# Patient Record
Sex: MALE | Race: WHITE | Employment: FULL TIME | URBAN - NONMETROPOLITAN AREA
[De-identification: names, ages, dates, MRNs, and addresses within clinical notes are randomized per-mention and may not be internally consistent; named-entity substitution may affect disease eponyms.]

---

## 2017-07-04 ENCOUNTER — APPOINTMENT (OUTPATIENT)
Dept: GENERAL RADIOLOGY | Age: 42
End: 2017-07-04
Payer: COMMERCIAL

## 2017-07-04 ENCOUNTER — HOSPITAL ENCOUNTER (EMERGENCY)
Age: 42
Discharge: HOME | End: 2017-07-05
Attending: EMERGENCY MEDICINE
Payer: COMMERCIAL

## 2017-07-04 DIAGNOSIS — F41.0 PANIC ATTACKS: Primary | ICD-10-CM

## 2017-07-04 DIAGNOSIS — F41.9 ANXIETY: ICD-10-CM

## 2017-07-04 DIAGNOSIS — R07.89 OTHER CHEST PAIN: ICD-10-CM

## 2017-07-04 LAB
ALBUMIN SERPL-MCNC: 4.3 G/DL (ref 3.5–5.2)
ALP BLD-CCNC: 82 U/L (ref 40–130)
ALT SERPL-CCNC: 19 U/L (ref 5–41)
ANION GAP SERPL CALCULATED.3IONS-SCNC: 15 MMOL/L (ref 7–19)
AST SERPL-CCNC: 18 U/L (ref 5–40)
BASOPHILS ABSOLUTE: 0 K/UL (ref 0–0.2)
BASOPHILS RELATIVE PERCENT: 0.5 % (ref 0–1)
BILIRUB SERPL-MCNC: 0.5 MG/DL (ref 0.2–1.2)
BUN BLDV-MCNC: 15 MG/DL (ref 6–20)
CALCIUM SERPL-MCNC: 9.2 MG/DL (ref 8.6–10)
CHLORIDE BLD-SCNC: 100 MMOL/L (ref 98–111)
CO2: 24 MMOL/L (ref 22–29)
CREAT SERPL-MCNC: 0.8 MG/DL (ref 0.5–1.2)
EOSINOPHILS ABSOLUTE: 0.2 K/UL (ref 0–0.6)
EOSINOPHILS RELATIVE PERCENT: 1.7 % (ref 0–5)
GFR NON-AFRICAN AMERICAN: >60
GLUCOSE BLD-MCNC: 115 MG/DL (ref 74–109)
HCT VFR BLD CALC: 41.7 % (ref 42–52)
HEMOGLOBIN: 14.2 G/DL (ref 14–18)
INR BLD: 0.94 (ref 0.88–1.18)
LYMPHOCYTES ABSOLUTE: 3.7 K/UL (ref 1.1–4.5)
LYMPHOCYTES RELATIVE PERCENT: 42.7 % (ref 20–40)
MCH RBC QN AUTO: 30.9 PG (ref 27–31)
MCHC RBC AUTO-ENTMCNC: 34.1 G/DL (ref 33–37)
MCV RBC AUTO: 90.7 FL (ref 80–94)
MONOCYTES ABSOLUTE: 0.6 K/UL (ref 0–0.9)
MONOCYTES RELATIVE PERCENT: 6.5 % (ref 0–10)
NEUTROPHILS ABSOLUTE: 4.2 K/UL (ref 1.5–7.5)
NEUTROPHILS RELATIVE PERCENT: 48.1 % (ref 50–65)
PDW BLD-RTO: 12.4 % (ref 11.5–14.5)
PERFORMED ON: NORMAL
PLATELET # BLD: 231 K/UL (ref 130–400)
PMV BLD AUTO: 11.1 FL (ref 9.4–12.4)
POC TROPONIN I: 0.01 NG/ML (ref 0–0.08)
POTASSIUM SERPL-SCNC: 3.7 MMOL/L (ref 3.5–5)
PROTHROMBIN TIME: 12.5 SEC (ref 12–14.6)
RBC # BLD: 4.6 M/UL (ref 4.7–6.1)
SODIUM BLD-SCNC: 139 MMOL/L (ref 136–145)
TOTAL PROTEIN: 7.1 G/DL (ref 6.6–8.7)
WBC # BLD: 8.7 K/UL (ref 4.8–10.8)

## 2017-07-04 PROCEDURE — 80053 COMPREHEN METABOLIC PANEL: CPT

## 2017-07-04 PROCEDURE — 93005 ELECTROCARDIOGRAM TRACING: CPT

## 2017-07-04 PROCEDURE — 99285 EMERGENCY DEPT VISIT HI MDM: CPT

## 2017-07-04 PROCEDURE — 85025 COMPLETE CBC W/AUTO DIFF WBC: CPT

## 2017-07-04 PROCEDURE — 71010 XR CHEST PORTABLE: CPT

## 2017-07-04 PROCEDURE — 85610 PROTHROMBIN TIME: CPT

## 2017-07-04 PROCEDURE — 84484 ASSAY OF TROPONIN QUANT: CPT

## 2017-07-04 PROCEDURE — 6370000000 HC RX 637 (ALT 250 FOR IP): Performed by: EMERGENCY MEDICINE

## 2017-07-04 PROCEDURE — 36415 COLL VENOUS BLD VENIPUNCTURE: CPT

## 2017-07-04 RX ORDER — ASPIRIN 81 MG/1
324 TABLET, CHEWABLE ORAL ONCE
Status: COMPLETED | OUTPATIENT
Start: 2017-07-04 | End: 2017-07-04

## 2017-07-04 RX ADMIN — ASPIRIN 81 MG CHEWABLE TABLET 324 MG: 81 TABLET CHEWABLE at 22:30

## 2017-07-04 ASSESSMENT — PAIN DESCRIPTION - LOCATION: LOCATION: CHEST

## 2017-07-04 ASSESSMENT — PAIN SCALES - GENERAL: PAINLEVEL_OUTOF10: 5

## 2017-07-04 ASSESSMENT — PAIN DESCRIPTION - PAIN TYPE: TYPE: ACUTE PAIN

## 2017-07-05 ENCOUNTER — CARE COORDINATION (OUTPATIENT)
Dept: PRIMARY CARE CLINIC | Age: 42
End: 2017-07-05

## 2017-07-05 VITALS
HEIGHT: 65 IN | TEMPERATURE: 97.6 F | OXYGEN SATURATION: 99 % | HEART RATE: 66 BPM | DIASTOLIC BLOOD PRESSURE: 80 MMHG | SYSTOLIC BLOOD PRESSURE: 118 MMHG | RESPIRATION RATE: 18 BRPM | BODY MASS INDEX: 30.82 KG/M2 | WEIGHT: 185 LBS

## 2017-07-05 LAB
PERFORMED ON: NORMAL
POC TROPONIN I: 0 NG/ML (ref 0–0.08)

## 2017-07-05 PROCEDURE — 84484 ASSAY OF TROPONIN QUANT: CPT

## 2017-07-05 PROCEDURE — 99282 EMERGENCY DEPT VISIT SF MDM: CPT | Performed by: EMERGENCY MEDICINE

## 2017-07-05 ASSESSMENT — ENCOUNTER SYMPTOMS
WHEEZING: 0
SORE THROAT: 0
DIARRHEA: 0
SHORTNESS OF BREATH: 1
RHINORRHEA: 0
COUGH: 0
NAUSEA: 0
ABDOMINAL PAIN: 0
VOMITING: 0
EYES NEGATIVE: 1
CHEST TIGHTNESS: 1

## 2017-07-10 LAB
EKG P AXIS: 44 DEGREES
EKG P AXIS: 54 DEGREES
EKG P-R INTERVAL: 132 MS
EKG P-R INTERVAL: 132 MS
EKG Q-T INTERVAL: 358 MS
EKG Q-T INTERVAL: 390 MS
EKG QRS DURATION: 80 MS
EKG QRS DURATION: 90 MS
EKG QTC CALCULATION (BAZETT): 391 MS
EKG QTC CALCULATION (BAZETT): 396 MS
EKG T AXIS: 37 DEGREES
EKG T AXIS: 38 DEGREES

## 2017-07-13 ENCOUNTER — OFFICE VISIT (OUTPATIENT)
Dept: PRIMARY CARE CLINIC | Age: 42
End: 2017-07-13
Payer: COMMERCIAL

## 2017-07-13 VITALS
SYSTOLIC BLOOD PRESSURE: 134 MMHG | HEIGHT: 65 IN | BODY MASS INDEX: 32.26 KG/M2 | RESPIRATION RATE: 17 BRPM | HEART RATE: 66 BPM | TEMPERATURE: 98.8 F | WEIGHT: 193.6 LBS | OXYGEN SATURATION: 97 % | DIASTOLIC BLOOD PRESSURE: 70 MMHG

## 2017-07-13 DIAGNOSIS — K21.9 GASTROESOPHAGEAL REFLUX DISEASE WITHOUT ESOPHAGITIS: ICD-10-CM

## 2017-07-13 DIAGNOSIS — F41.9 ANXIETY AND DEPRESSION: Primary | ICD-10-CM

## 2017-07-13 DIAGNOSIS — G44.219 EPISODIC TENSION-TYPE HEADACHE, NOT INTRACTABLE: ICD-10-CM

## 2017-07-13 DIAGNOSIS — F32.A ANXIETY AND DEPRESSION: Primary | ICD-10-CM

## 2017-07-13 DIAGNOSIS — F40.10 SOCIAL ANXIETY DISORDER: ICD-10-CM

## 2017-07-13 PROCEDURE — 99203 OFFICE O/P NEW LOW 30 MIN: CPT | Performed by: NURSE PRACTITIONER

## 2017-07-13 RX ORDER — SERTRALINE HYDROCHLORIDE 25 MG/1
25 TABLET, FILM COATED ORAL DAILY
Qty: 30 TABLET | Refills: 3 | Status: SHIPPED | OUTPATIENT
Start: 2017-07-13 | End: 2017-09-07 | Stop reason: SDUPTHER

## 2017-07-13 RX ORDER — FAMOTIDINE 20 MG/1
20 TABLET, FILM COATED ORAL 2 TIMES DAILY
Qty: 60 TABLET | Refills: 3 | Status: SHIPPED | OUTPATIENT
Start: 2017-07-13 | End: 2017-09-07 | Stop reason: SDUPTHER

## 2017-07-15 PROBLEM — F41.9 ANXIETY AND DEPRESSION: Status: ACTIVE | Noted: 2017-07-15

## 2017-07-15 PROBLEM — F40.10 SOCIAL ANXIETY DISORDER: Status: ACTIVE | Noted: 2017-07-15

## 2017-07-15 PROBLEM — F32.A ANXIETY AND DEPRESSION: Status: ACTIVE | Noted: 2017-07-15

## 2017-09-07 ENCOUNTER — OFFICE VISIT (OUTPATIENT)
Dept: PRIMARY CARE CLINIC | Age: 42
End: 2017-09-07
Payer: COMMERCIAL

## 2017-09-07 VITALS
HEIGHT: 65 IN | DIASTOLIC BLOOD PRESSURE: 81 MMHG | OXYGEN SATURATION: 99 % | BODY MASS INDEX: 32.22 KG/M2 | TEMPERATURE: 98.8 F | HEART RATE: 65 BPM | RESPIRATION RATE: 17 BRPM | WEIGHT: 193.4 LBS | SYSTOLIC BLOOD PRESSURE: 126 MMHG

## 2017-09-07 DIAGNOSIS — Z76.0 MEDICATION REFILL: ICD-10-CM

## 2017-09-07 DIAGNOSIS — F40.10 SOCIAL ANXIETY DISORDER: Primary | ICD-10-CM

## 2017-09-07 PROCEDURE — 99213 OFFICE O/P EST LOW 20 MIN: CPT | Performed by: NURSE PRACTITIONER

## 2017-09-07 RX ORDER — SERTRALINE HYDROCHLORIDE 25 MG/1
25 TABLET, FILM COATED ORAL DAILY
Qty: 30 TABLET | Refills: 5 | Status: SHIPPED | OUTPATIENT
Start: 2017-09-07 | End: 2018-02-07 | Stop reason: SDUPTHER

## 2017-09-07 RX ORDER — FAMOTIDINE 20 MG/1
20 TABLET, FILM COATED ORAL 2 TIMES DAILY
Qty: 60 TABLET | Refills: 5 | Status: SHIPPED | OUTPATIENT
Start: 2017-09-07 | End: 2018-03-16 | Stop reason: SDUPTHER

## 2018-02-07 DIAGNOSIS — F40.10 SOCIAL ANXIETY DISORDER: ICD-10-CM

## 2018-02-07 RX ORDER — SERTRALINE HYDROCHLORIDE 25 MG/1
25 TABLET, FILM COATED ORAL DAILY
Qty: 30 TABLET | Refills: 0 | Status: SHIPPED | OUTPATIENT
Start: 2018-02-07 | End: 2018-03-16 | Stop reason: SDUPTHER

## 2018-03-16 ENCOUNTER — OFFICE VISIT (OUTPATIENT)
Dept: PRIMARY CARE CLINIC | Age: 43
End: 2018-03-16
Payer: COMMERCIAL

## 2018-03-16 VITALS
WEIGHT: 198 LBS | HEIGHT: 65 IN | OXYGEN SATURATION: 98 % | TEMPERATURE: 98.3 F | RESPIRATION RATE: 17 BRPM | DIASTOLIC BLOOD PRESSURE: 84 MMHG | BODY MASS INDEX: 32.99 KG/M2 | HEART RATE: 70 BPM | SYSTOLIC BLOOD PRESSURE: 124 MMHG

## 2018-03-16 DIAGNOSIS — Z76.0 MEDICATION REFILL: ICD-10-CM

## 2018-03-16 DIAGNOSIS — F40.10 SOCIAL ANXIETY DISORDER: ICD-10-CM

## 2018-03-16 PROCEDURE — 99213 OFFICE O/P EST LOW 20 MIN: CPT | Performed by: NURSE PRACTITIONER

## 2018-03-16 RX ORDER — SERTRALINE HYDROCHLORIDE 25 MG/1
25 TABLET, FILM COATED ORAL DAILY
Qty: 30 TABLET | Refills: 0 | Status: SHIPPED | OUTPATIENT
Start: 2018-03-16 | End: 2018-04-23 | Stop reason: SDUPTHER

## 2018-03-16 RX ORDER — FAMOTIDINE 20 MG/1
20 TABLET, FILM COATED ORAL 2 TIMES DAILY
Qty: 60 TABLET | Refills: 5 | Status: SHIPPED | OUTPATIENT
Start: 2018-03-16 | End: 2019-06-11 | Stop reason: SDUPTHER

## 2018-03-18 ASSESSMENT — ENCOUNTER SYMPTOMS
COUGH: 0
WHEEZING: 0
SHORTNESS OF BREATH: 0

## 2018-03-19 NOTE — PROGRESS NOTES
Skin is warm and dry. Psychiatric: He has a normal mood and affect. His behavior is normal. Judgment and thought content normal.   Nursing note and vitals reviewed. Assessment:      Anxiety Disorder - improving   1. Medication refill  famotidine (PEPCID) 20 MG tablet   2. Social anxiety disorder Stable sertraline (ZOLOFT) 25 MG tablet    She was doing well on Zoloft 25 mg daily he is now able to go and stores and feels more comfortable and relaxed work. Plan:   Pedro Chau received counseling on the following healthy behaviors: nutrition, exercise, medication adherence and crisis intervention. Medications: Zoloft. Reviewed concept of anxiety as biochemical imbalance of neurotransmitters and rationale for treatment. Instructed patient to contact office or on-call physician promptly should condition worsen or any new symptoms appear and provided on-call telephone numbers. IF THE PATIENT HAS ANY SUICIDAL OR HOMICIDAL IDEATIONS, CALL THE OFFICE, DISCUSS WITH A SUPPORT MEMBER, OR GO TO THE ER IMMEDIATELY. Patient was agreeable with this plan. Follow up: 6 months. Spent 15 minutes (>50% of visit) discussing the risks of anxiety disorder, the  pathophysiology, etiology, risks, and principles of treatment.

## 2018-03-29 ENCOUNTER — HOSPITAL ENCOUNTER (OUTPATIENT)
Dept: GENERAL RADIOLOGY | Age: 43
Discharge: HOME | End: 2018-03-29
Payer: COMMERCIAL

## 2018-03-29 ENCOUNTER — OFFICE VISIT (OUTPATIENT)
Dept: PRIMARY CARE CLINIC | Age: 43
End: 2018-03-29
Payer: COMMERCIAL

## 2018-03-29 VITALS
BODY MASS INDEX: 32.99 KG/M2 | OXYGEN SATURATION: 98 % | DIASTOLIC BLOOD PRESSURE: 78 MMHG | RESPIRATION RATE: 18 BRPM | HEART RATE: 76 BPM | TEMPERATURE: 97.9 F | SYSTOLIC BLOOD PRESSURE: 122 MMHG | WEIGHT: 198 LBS | HEIGHT: 65 IN

## 2018-03-29 DIAGNOSIS — S13.9XXA NECK SPRAIN, INITIAL ENCOUNTER: ICD-10-CM

## 2018-03-29 DIAGNOSIS — S13.9XXA NECK SPRAIN, INITIAL ENCOUNTER: Primary | ICD-10-CM

## 2018-03-29 PROCEDURE — 99213 OFFICE O/P EST LOW 20 MIN: CPT | Performed by: NURSE PRACTITIONER

## 2018-03-29 PROCEDURE — 72040 X-RAY EXAM NECK SPINE 2-3 VW: CPT

## 2018-03-29 RX ORDER — METAXALONE 800 MG/1
800 TABLET ORAL 3 TIMES DAILY
Qty: 30 TABLET | Refills: 0 | Status: SHIPPED | OUTPATIENT
Start: 2018-03-29 | End: 2018-04-08

## 2018-03-29 NOTE — PROGRESS NOTES
rate, regular rhythm, normal heart sounds and intact distal pulses. Pulmonary/Chest: Effort normal and breath sounds normal.   Musculoskeletal: He exhibits tenderness. Neurological: He is alert and oriented to person, place, and time. Skin: Skin is warm and dry. Psychiatric: He has a normal mood and affect. His behavior is normal.   Nursing note and vitals reviewed. X-ray of the cervical spine:   Normal         Impression   No acute bony abnormality. The loss of cervical lordosis may suggest muscular or ligamentous   strain. Signed by Dr Viktoriya Mcgrath on 3/29/2018 9:36 AM           Assessment:      Cervical strain. Severity of pain: not present      1. Neck sprain, initial encounter  metaxalone (SKELAXIN) 800 MG tablet    XR CERVICAL SPINE (2-3 VIEWS)     Plan:     Barak Monroy was seen today for neck pain. Diagnoses and all orders for this visit:    Neck sprain, initial encounter  -     metaxalone (SKELAXIN) 800 MG tablet; Take 1 tablet by mouth 3 times daily for 10 days  -     XR CERVICAL SPINE (2-3 VIEWS);  Future       PT referral.

## 2018-04-23 DIAGNOSIS — F40.10 SOCIAL ANXIETY DISORDER: ICD-10-CM

## 2018-04-23 RX ORDER — SERTRALINE HYDROCHLORIDE 25 MG/1
TABLET, FILM COATED ORAL
Qty: 30 TABLET | Refills: 11 | Status: SHIPPED | OUTPATIENT
Start: 2018-04-23 | End: 2019-08-06 | Stop reason: SDUPTHER

## 2019-05-03 ENCOUNTER — OFFICE VISIT (OUTPATIENT)
Dept: PRIMARY CARE CLINIC | Age: 44
End: 2019-05-03
Payer: COMMERCIAL

## 2019-05-03 VITALS
OXYGEN SATURATION: 99 % | SYSTOLIC BLOOD PRESSURE: 126 MMHG | HEIGHT: 65 IN | DIASTOLIC BLOOD PRESSURE: 70 MMHG | BODY MASS INDEX: 32.82 KG/M2 | HEART RATE: 82 BPM | WEIGHT: 197 LBS | TEMPERATURE: 96.7 F

## 2019-05-03 DIAGNOSIS — R53.83 OTHER FATIGUE: ICD-10-CM
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
## 2019-05-03 DIAGNOSIS — H53.8 BLURRED VISION: ICD-10-CM

## 2019-05-03 DIAGNOSIS — Z13.1 SCREENING FOR DIABETES MELLITUS (DM): ICD-10-CM
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
## 2019-05-03 DIAGNOSIS — R53.83 OTHER FATIGUE: Primary | ICD-10-CM

## 2019-05-03 LAB
ALBUMIN SERPL-MCNC: 4.4 G/DL (ref 3.5–5.2)
ALP BLD-CCNC: 79 U/L (ref 40–130)
ALT SERPL-CCNC: 19 U/L (ref 5–41)
ANION GAP SERPL CALCULATED.3IONS-SCNC: 13 MMOL/L (ref 7–19)
AST SERPL-CCNC: 14 U/L (ref 5–40)
BILIRUB SERPL-MCNC: 0.3 MG/DL (ref 0.2–1.2)
BUN BLDV-MCNC: 17 MG/DL (ref 6–20)
CALCIUM SERPL-MCNC: 9.6 MG/DL (ref 8.6–10)
CHLORIDE BLD-SCNC: 105 MMOL/L (ref 98–111)
CO2: 26 MMOL/L (ref 22–29)
CREAT SERPL-MCNC: 1.1 MG/DL (ref 0.5–1.2)
GFR NON-AFRICAN AMERICAN: >60
GLUCOSE BLD-MCNC: 111 MG/DL (ref 74–109)
HBA1C MFR BLD: 5.6 % (ref 4–6)
HCT VFR BLD CALC: 44 % (ref 42–52)
HEMOGLOBIN: 14.8 G/DL (ref 14–18)
MCH RBC QN AUTO: 30.8 PG (ref 27–31)
MCHC RBC AUTO-ENTMCNC: 33.6 G/DL (ref 33–37)
MCV RBC AUTO: 91.5 FL (ref 80–94)
PDW BLD-RTO: 12.3 % (ref 11.5–14.5)
PLATELET # BLD: 274 K/UL (ref 130–400)
PMV BLD AUTO: 11.6 FL (ref 9.4–12.4)
POTASSIUM SERPL-SCNC: 4.1 MMOL/L (ref 3.5–5)
RBC # BLD: 4.81 M/UL (ref 4.7–6.1)
SODIUM BLD-SCNC: 144 MMOL/L (ref 136–145)
TOTAL PROTEIN: 7.3 G/DL (ref 6.6–8.7)
WBC # BLD: 9.3 K/UL (ref 4.8–10.8)

## 2019-05-03 PROCEDURE — 99214 OFFICE O/P EST MOD 30 MIN: CPT | Performed by: NURSE PRACTITIONER

## 2019-05-03 RX ORDER — DOXYCYCLINE HYCLATE 100 MG
100 TABLET ORAL 2 TIMES DAILY
Qty: 20 TABLET | Refills: 0 | Status: SHIPPED | OUTPATIENT
Start: 2019-05-03 | End: 2019-05-13

## 2019-05-03 ASSESSMENT — PATIENT HEALTH QUESTIONNAIRE - PHQ9
SUM OF ALL RESPONSES TO PHQ9 QUESTIONS 1 & 2: 0
SUM OF ALL RESPONSES TO PHQ QUESTIONS 1-9: 0
2. FEELING DOWN, DEPRESSED OR HOPELESS: 0
SUM OF ALL RESPONSES TO PHQ QUESTIONS 1-9: 0
1. LITTLE INTEREST OR PLEASURE IN DOING THINGS: 0

## 2019-05-03 ASSESSMENT — ENCOUNTER SYMPTOMS
RESPIRATORY NEGATIVE: 1
NAUSEA: 1
ALLERGIC/IMMUNOLOGIC NEGATIVE: 1
DIARRHEA: 0

## 2019-05-03 NOTE — PROGRESS NOTES
5/3/2019     Mary Bustillo (:  1975) is a 53 y.o. male, here for evaluation of the following medical concerns:    HPI     Patient reports he removed a tick from his umbilical area proximally one week ago since that time he has malaise, fatigue, chills. He also tells me he has had some mild blurred vision in his eyes and been watery and runny he's had some nausea and no diarrhea or vomiting. He is concerned he may have a tickborne illness. He also reports some mild arthralgias and myalgias. He is also had a few episodes of some mild dizziness and weakness. Patient is due for diabetic training and given his symptoms I think this may be warranted at this time along with some additional lab work. To rule out tickborne illness or other underlying metabolic concern including dehydration. Review of Systems   Constitutional: Positive for appetite change, chills and fatigue. Negative for activity change, diaphoresis, fever and unexpected weight change. Eyes: Positive for visual disturbance. Respiratory: Negative. Cardiovascular: Negative. Gastrointestinal: Positive for nausea. Negative for diarrhea. Endocrine: Negative. Genitourinary: Negative. Musculoskeletal: Positive for arthralgias and myalgias. Skin:        Puncture site navel area, mild redness   Allergic/Immunologic: Negative. Neurological: Positive for dizziness and weakness. Negative for tremors, seizures, syncope, facial asymmetry, speech difficulty, light-headedness and numbness. Hematological: Negative. Psychiatric/Behavioral: Negative. Prior to Visit Medications    Medication Sig Taking?  Authorizing Provider   doxycycline hyclate (VIBRA-TABS) 100 MG tablet Take 1 tablet by mouth 2 times daily for 10 days Yes PA Reilly   sertraline (ZOLOFT) 25 MG tablet TAKE ONE TABLET ONE TIME DAILY Yes PA Reilly   famotidine (PEPCID) 20 MG tablet Take 1 tablet by mouth 2 times daily Yes Ivania Ramirez

## 2019-05-05 LAB — LYME, EIA: 0.13 LIV (ref 0–1.2)

## 2019-05-06 LAB
EHRLICHIA CHAFFEENSIS AB, IGG: NORMAL
EHRLICHIA CHAFFEENSIS AB, IGM: NORMAL

## 2019-05-07 LAB
ANAPLASMA PHAGNOCYTOPHILUM BY PCR: NOT DETECTED
EHRLICHIA CHAFFEENSIS BY PCR: NOT DETECTED
EHRLICHIA EWINGIL/CANIS BY PCR: NOT DETECTED
EHRLICHIA MURIS-LIKE BY PCR: NOT DETECTED

## 2019-05-15 ENCOUNTER — TELEPHONE (OUTPATIENT)
Dept: PRIMARY CARE CLINIC | Age: 44
End: 2019-05-15

## 2019-05-15 NOTE — TELEPHONE ENCOUNTER
----- Message from PA Law sent at 5/5/2019  8:23 PM CDT -----  Please notify patient of normal results.

## 2019-05-15 NOTE — TELEPHONE ENCOUNTER
My Chart message sent with Normal results    Please notify patient of normal results. Associated Results     Result Notes for B. Burgdorferi Antibodies     Notes recorded by PA Shepherd on 5/7/2019 at 12:42 PM CDT  Please notify patient of normal results. ------    Notes recorded by PA Shepherd on 5/6/2019 at 8:49 AM CDT  Please notify patient of normal results. ------    Notes recorded by PA Shepherd on 5/5/2019 at 8:23 PM CDT  Please notify patient of normal results. Roger Chacon Antibodies   Order: [id number]   Status:  Final result   Visible to patient:  Yes (MyChart) Dx:  Tick bite, initial encounter    Ref Range & Units 12d ago   [place], EIA 0.00 - 1.20 LAUREN 0.13    Comment: INTERPRETIVE INFORMATION: Borrelia Burgdorferi Abs,Total by FLOWER     0.99 LAUREN or Less: . ..... Negative: Antibody to B.                              burgdorferi not detected.     1.00 - 1.20 LAUREN. ........ Equivocal: Repeat testing in                              10-14 days may be helpful.     1.21 LAUREN or Greater: . .. Positive: Probable presence of                              antibody to B. burgdorferi                              detected. Performed by [place] [id number], [address] [facility] [phone]   www. Lupe Anthony MD - Lab. Director    Resulting Agency  [place]         Specimen Collected: 05/03/19 14:57 Last Resulted: 05/05/19 14:58         Lab Flowsheet       Order Details       View Encounter       Lab and Collection Details       Routing       Result History               Result Notes for Hemoglobin A1C     Notes recorded by PA Shepherd on 5/7/2019 at 12:42 PM CDT  Please notify patient of normal results. ------    Notes recorded by PA Shepherd on 5/6/2019 at 8:49 AM CDT  Please notify patient of normal results. ------    Notes recorded by PA Shepherd on 5/5/2019 at 8:23 PM CDT  Please notify patient of normal results.    Hemoglobin A1C   Order: [id number]   Status:  Final result   Visible to patient:  Yes ([address]) Dx:  Blurred vision; Screening for diabete. .. Ref Range & Units 12d ago   Hemoglobin A1C 4.0 - 6.0 % 5.6    Comment: HbA1c levels >6% are an indication of hyperglycemia during the preceding 2   to 3 months or longer. HbA1c levels may reach 20% or higher in poorly controlled diabetes. Therapeutic action is suggested at levels above 8%. Diabetes patients with HbA1c levels below 7% meet the goal of the American   Diabetes Association. HbA1c levels below the established reference range may indicate recent   episodes of hypoglycemia, the presence of Hb variants, or shortened lifetime   of erythrocytes.     Resulting Agency  [facility]         Specimen Collected: 05/03/19 14:57 Last Resulted: 05/03/19 16:15         Lab Flowsheet       Order Details       View Encounter       Lab and Collection Details       Routing       Result History               Result Notes for Comprehensive Metabolic Panel     Notes recorded by PA Leon on 5/7/2019 at 12:42 PM CDT  Please notify patient of normal results. ------    Notes recorded by PA Leon on 5/6/2019 at 8:49 AM CDT  Please notify patient of normal results. ------    Notes recorded by PA Leon on 5/5/2019 at 8:23 PM CDT  Please notify patient of normal results. Contains abnormal data Comprehensive Metabolic Panel   Order: [id number]   Status:  Final result   Visible to patient:  Yes (Bobbi) Dx:  Blurred vision; Tick bite, initial en. .. Ref Range & Units 12d ago 1yr ago   Sodium 136 - 145 mmol/L 144  139    Potassium 3.5 - 5.0 mmol/L 4.1  3.7    Chloride 98 - 111 mmol/L 105  100    CO2 22 - 29 mmol/L 26  24    Anion Gap 7 - 19 mmol/L 13  15    Glucose 74 - 109 mg/dL 111High   115High     BUN 6 - 20 mg/dL 17  15    CREATININE 0.5 - 1.2 mg/dL 1.1  0.8    GFR Non- >60 >60  >60 CM   Comment:  This calculation may be inaccurate for patients under the age of 25 years. For ages 25 and older, a GFR >60 mL/min/1.73m2 (not corrected for weight) is   valid for stable renal function. Calcium 8.6 - 10.0 mg/dL 9.6  9.2    Total Protein 6.6 - 8.7 g/dL 7.3  7.1    Alb 3.5 - 5.2 g/dL 4.4  4.3    Total Bilirubin 0.2 - 1.2 mg/dL 0.3  0.5    Alkaline Phosphatase 40 - 130 U/L 79  82    ALT 5 - 41 U/L 19  19    AST 5 - 40 U/L 14  18    Resulting Agency  [facility]         Specimen Collected: 05/03/19 14:57 Last Resulted: 05/03/19 15:56         Lab Flowsheet       Order Details       View Encounter       Lab and Collection Details       Routing       Result History         CM=Additional comments           Result Notes for CBC     Notes recorded by PA Narvaez on 5/7/2019 at 12:42 PM CDT  Please notify patient of normal results. ------    Notes recorded by PA Naravez on 5/6/2019 at 8:49 AM CDT  Please notify patient of normal results. ------    Notes recorded by PA Narvaez on 5/5/2019 at 8:23 PM CDT  Please notify patient of normal results. CBC   Order: [id number]   Status:  Final result   Visible to patient:  Yes (MyChart) Dx:  Blurred vision; Other fatigue; Tick b. ..     Ref Range & Units 12d ago 1yr ago   WBC 4.8 - 10.8 K/uL 9.3  8.7    RBC 4.70 - 6.10 M/uL 4.81  4.60Low     Hemoglobin 14.0 - 18.0 g/dL 14.8  14.2    Hematocrit 42.0 - 52.0 % 44.0  41.7Low     MCV 80.0 - 94.0 fL 91.5  90.7    MCH 27.0 - 31.0 pg 30.8  30.9    MCHC 33.0 - 37.0 g/dL 33.6  34.1    RDW 11.5 - 14.5 % 12.3  12.4    Platelets 883 - 022 K/uL 274  231    MPV 9.4 - 12.4 fL 11.6  11.1    Neutrophils %   48.1Low     Basophils #   0.00    Lymphocytes %   42.7High     Monocytes %   6.5    Eosinophils %   1.7    Basophils %   0.5    Neutrophils #   4.2    Lymphocytes #   3.7    Monocytes #   0.60    Eosinophils #   0.20    Resulting Agency  [facility]         Specimen

## 2019-06-11 DIAGNOSIS — Z76.0 MEDICATION REFILL: ICD-10-CM

## 2019-06-11 RX ORDER — FAMOTIDINE 20 MG/1
TABLET, FILM COATED ORAL
Qty: 60 TABLET | Refills: 5 | Status: SHIPPED | OUTPATIENT
Start: 2019-06-11 | End: 2019-12-17 | Stop reason: SDUPTHER

## 2019-10-10 ENCOUNTER — OFFICE VISIT (OUTPATIENT)
Dept: PRIMARY CARE CLINIC | Age: 44
End: 2019-10-10
Payer: COMMERCIAL

## 2019-10-10 VITALS
OXYGEN SATURATION: 100 % | TEMPERATURE: 98.8 F | DIASTOLIC BLOOD PRESSURE: 78 MMHG | BODY MASS INDEX: 32.65 KG/M2 | SYSTOLIC BLOOD PRESSURE: 124 MMHG | WEIGHT: 196 LBS | HEIGHT: 65 IN | HEART RATE: 72 BPM

## 2019-10-10 DIAGNOSIS — Z23 NEED FOR INFLUENZA VACCINATION: ICD-10-CM

## 2019-10-10 DIAGNOSIS — Z13.220 SCREENING FOR HYPERLIPIDEMIA: Primary | ICD-10-CM

## 2019-10-10 DIAGNOSIS — Z20.2 POSSIBLE EXPOSURE TO STD: ICD-10-CM

## 2019-10-10 LAB
APPEARANCE FLUID: CLEAR
BILIRUBIN, POC: NORMAL
BLOOD URINE, POC: NORMAL
CLARITY, POC: CLEAR
COLOR, POC: YELLOW
GLUCOSE URINE, POC: NORMAL
KETONES, POC: NORMAL
LEUKOCYTE EST, POC: NORMAL
NITRITE, POC: NORMAL
PH, POC: 7
PROTEIN, POC: NORMAL
SPECIFIC GRAVITY, POC: 1.02
UROBILINOGEN, POC: 0.2

## 2019-10-10 PROCEDURE — 81002 URINALYSIS NONAUTO W/O SCOPE: CPT | Performed by: NURSE PRACTITIONER

## 2019-10-10 PROCEDURE — 90471 IMMUNIZATION ADMIN: CPT | Performed by: NURSE PRACTITIONER

## 2019-10-10 PROCEDURE — 90686 IIV4 VACC NO PRSV 0.5 ML IM: CPT | Performed by: NURSE PRACTITIONER

## 2019-10-10 PROCEDURE — 99213 OFFICE O/P EST LOW 20 MIN: CPT | Performed by: NURSE PRACTITIONER

## 2019-10-10 RX ORDER — FLUCONAZOLE 100 MG/1
100 TABLET ORAL DAILY
Qty: 3 TABLET | Refills: 0 | Status: SHIPPED | OUTPATIENT
Start: 2019-10-10 | End: 2019-10-13

## 2019-12-17 ENCOUNTER — OFFICE VISIT (OUTPATIENT)
Dept: PRIMARY CARE CLINIC | Age: 44
End: 2019-12-17
Payer: COMMERCIAL

## 2019-12-17 VITALS
SYSTOLIC BLOOD PRESSURE: 136 MMHG | OXYGEN SATURATION: 98 % | BODY MASS INDEX: 33.12 KG/M2 | HEIGHT: 64 IN | DIASTOLIC BLOOD PRESSURE: 68 MMHG | HEART RATE: 68 BPM | WEIGHT: 194 LBS | RESPIRATION RATE: 20 BRPM | TEMPERATURE: 99.1 F

## 2019-12-17 DIAGNOSIS — Z76.0 MEDICATION REFILL: ICD-10-CM

## 2019-12-17 DIAGNOSIS — B99.9 GENITAL INFECTION: Primary | ICD-10-CM

## 2019-12-17 DIAGNOSIS — F40.10 SOCIAL ANXIETY DISORDER: ICD-10-CM

## 2019-12-17 DIAGNOSIS — Z20.2 STD EXPOSURE: ICD-10-CM

## 2019-12-17 PROCEDURE — 99213 OFFICE O/P EST LOW 20 MIN: CPT | Performed by: NURSE PRACTITIONER

## 2019-12-17 RX ORDER — SERTRALINE HYDROCHLORIDE 25 MG/1
TABLET, FILM COATED ORAL
Qty: 30 TABLET | Refills: 11 | Status: SHIPPED | OUTPATIENT
Start: 2019-12-17 | End: 2020-07-20 | Stop reason: SDUPTHER

## 2019-12-17 RX ORDER — METRONIDAZOLE 500 MG/1
500 TABLET ORAL 3 TIMES DAILY
Qty: 21 TABLET | Refills: 0 | Status: SHIPPED | OUTPATIENT
Start: 2019-12-17 | End: 2019-12-24

## 2019-12-17 RX ORDER — FAMOTIDINE 20 MG/1
TABLET, FILM COATED ORAL
Qty: 60 TABLET | Refills: 5 | Status: SHIPPED | OUTPATIENT
Start: 2019-12-17 | End: 2020-08-12

## 2019-12-17 ASSESSMENT — ENCOUNTER SYMPTOMS
VOICE CHANGE: 0
VOMITING: 0
COLOR CHANGE: 0
COUGH: 0
BACK PAIN: 0
SHORTNESS OF BREATH: 0
RHINORRHEA: 0
NAUSEA: 0
PHOTOPHOBIA: 0

## 2020-07-15 ENCOUNTER — HOSPITAL ENCOUNTER (EMERGENCY)
Age: 45
Discharge: HOME | End: 2020-07-16
Attending: EMERGENCY MEDICINE
Payer: COMMERCIAL

## 2020-07-15 ENCOUNTER — APPOINTMENT (OUTPATIENT)
Dept: GENERAL RADIOLOGY | Age: 45
End: 2020-07-15
Payer: COMMERCIAL

## 2020-07-15 LAB
ALBUMIN SERPL-MCNC: 4.4 G/DL (ref 3.5–5.2)
ALP BLD-CCNC: 65 U/L (ref 40–130)
ALT SERPL-CCNC: 16 U/L (ref 5–41)
ANION GAP SERPL CALCULATED.3IONS-SCNC: 12 MMOL/L (ref 7–19)
AST SERPL-CCNC: 16 U/L (ref 5–40)
BASOPHILS ABSOLUTE: 0 K/UL (ref 0–0.2)
BASOPHILS RELATIVE PERCENT: 0.5 % (ref 0–1)
BILIRUB SERPL-MCNC: 0.6 MG/DL (ref 0.2–1.2)
BUN BLDV-MCNC: 13 MG/DL (ref 6–20)
CALCIUM SERPL-MCNC: 9.6 MG/DL (ref 8.6–10)
CHLORIDE BLD-SCNC: 105 MMOL/L (ref 98–111)
CO2: 24 MMOL/L (ref 22–29)
CREAT SERPL-MCNC: 0.8 MG/DL (ref 0.5–1.2)
EOSINOPHILS ABSOLUTE: 0.2 K/UL (ref 0–0.6)
EOSINOPHILS RELATIVE PERCENT: 2 % (ref 0–5)
GFR AFRICAN AMERICAN: >59
GFR NON-AFRICAN AMERICAN: >60
GLUCOSE BLD-MCNC: 98 MG/DL (ref 74–109)
HCT VFR BLD CALC: 42.9 % (ref 42–52)
HEMOGLOBIN: 14.2 G/DL (ref 14–18)
IMMATURE GRANULOCYTES #: 0 K/UL
LYMPHOCYTES ABSOLUTE: 3 K/UL (ref 1.1–4.5)
LYMPHOCYTES RELATIVE PERCENT: 37.8 % (ref 20–40)
MCH RBC QN AUTO: 30.6 PG (ref 27–31)
MCHC RBC AUTO-ENTMCNC: 33.1 G/DL (ref 33–37)
MCV RBC AUTO: 92.5 FL (ref 80–94)
MONOCYTES ABSOLUTE: 0.6 K/UL (ref 0–0.9)
MONOCYTES RELATIVE PERCENT: 7.2 % (ref 0–10)
NEUTROPHILS ABSOLUTE: 4.1 K/UL (ref 1.5–7.5)
NEUTROPHILS RELATIVE PERCENT: 52.1 % (ref 50–65)
PDW BLD-RTO: 12.3 % (ref 11.5–14.5)
PLATELET # BLD: 236 K/UL (ref 130–400)
PMV BLD AUTO: 11.6 FL (ref 9.4–12.4)
POTASSIUM SERPL-SCNC: 4.3 MMOL/L (ref 3.5–5)
RBC # BLD: 4.64 M/UL (ref 4.7–6.1)
SODIUM BLD-SCNC: 141 MMOL/L (ref 136–145)
TOTAL PROTEIN: 6.6 G/DL (ref 6.6–8.7)
TROPONIN: <0.01 NG/ML (ref 0–0.03)
WBC # BLD: 7.9 K/UL (ref 4.8–10.8)

## 2020-07-15 PROCEDURE — 71045 X-RAY EXAM CHEST 1 VIEW: CPT

## 2020-07-15 PROCEDURE — 93005 ELECTROCARDIOGRAM TRACING: CPT | Performed by: EMERGENCY MEDICINE

## 2020-07-15 PROCEDURE — 99285 EMERGENCY DEPT VISIT HI MDM: CPT

## 2020-07-15 RX ORDER — ASPIRIN 325 MG
325 TABLET ORAL ONCE
Status: COMPLETED | OUTPATIENT
Start: 2020-07-15 | End: 2020-07-16

## 2020-07-15 RX ORDER — NITROGLYCERIN 0.4 MG/1
0.4 TABLET SUBLINGUAL EVERY 5 MIN PRN
Status: DISCONTINUED | OUTPATIENT
Start: 2020-07-15 | End: 2020-07-16 | Stop reason: HOSPADM

## 2020-07-15 ASSESSMENT — PAIN SCALES - GENERAL: PAINLEVEL_OUTOF10: 4

## 2020-07-16 VITALS
SYSTOLIC BLOOD PRESSURE: 108 MMHG | DIASTOLIC BLOOD PRESSURE: 78 MMHG | HEART RATE: 52 BPM | BODY MASS INDEX: 31.65 KG/M2 | OXYGEN SATURATION: 98 % | HEIGHT: 65 IN | TEMPERATURE: 98 F | RESPIRATION RATE: 20 BRPM | WEIGHT: 190 LBS

## 2020-07-16 LAB
EKG P AXIS: 33 DEGREES
EKG P-R INTERVAL: 130 MS
EKG Q-T INTERVAL: 378 MS
EKG QRS DURATION: 80 MS
EKG QTC CALCULATION (BAZETT): 385 MS
EKG T AXIS: 43 DEGREES
TROPONIN: <0.01 NG/ML (ref 0–0.03)

## 2020-07-16 PROCEDURE — 36415 COLL VENOUS BLD VENIPUNCTURE: CPT

## 2020-07-16 PROCEDURE — 85025 COMPLETE CBC W/AUTO DIFF WBC: CPT

## 2020-07-16 PROCEDURE — 6370000000 HC RX 637 (ALT 250 FOR IP): Performed by: EMERGENCY MEDICINE

## 2020-07-16 PROCEDURE — 80053 COMPREHEN METABOLIC PANEL: CPT

## 2020-07-16 PROCEDURE — 84484 ASSAY OF TROPONIN QUANT: CPT

## 2020-07-16 RX ADMIN — NITROGLYCERIN 0.4 MG: 0.4 TABLET, ORALLY DISINTEGRATING SUBLINGUAL at 00:38

## 2020-07-16 RX ADMIN — ASPIRIN 325 MG: 325 TABLET, FILM COATED ORAL at 00:37

## 2020-07-16 ASSESSMENT — ENCOUNTER SYMPTOMS
COUGH: 0
VOMITING: 0
SHORTNESS OF BREATH: 1
NAUSEA: 0
ABDOMINAL PAIN: 0
RHINORRHEA: 0
DIARRHEA: 0
SORE THROAT: 0

## 2020-07-16 ASSESSMENT — PAIN SCALES - GENERAL
PAINLEVEL_OUTOF10: 2
PAINLEVEL_OUTOF10: 0

## 2020-07-16 ASSESSMENT — PAIN DESCRIPTION - LOCATION: LOCATION: CHEST

## 2020-07-16 NOTE — ED PROVIDER NOTES
[facility]  eMERGENCY dEPARTMENT eNCOUnter      Pt Name: Edward Coronado  MRN: [id number]  [place] 1975  Date of evaluation: 7/15/2020  Provider: Tiffany Pelletier MD    [address] [place] [address]       Chief Complaint   Patient presents with    Chest Pain    Shortness of Breath         HISTORY OF PRESENT ILLNESS   (Location/Symptom, Timing/Onset,Context/Setting, Quality, Duration, Modifying Factors, Severity)  Note limiting factors. Edward Coronado is a 50 y.o. male who presents to the emergency department for chest pain and shortness of breath. Tells me yesterday developed some central left-sided chest heaviness and pressure that has been constant overall. Occasionally somewhat worse. Not obviously exertional in discussing with him. Not had any cough fever chills or covid exposure. Does admit that he has had anxiety and chest tightness in the past but this feels different to him. No prior cardiac history. Denies smoking hypertension hyperlipidemia diabetes. Tells me his mother had some heart issues around age 54. He has never had a stress test or heart cath. HPI    NursingNotes were reviewed. REVIEW OF SYSTEMS    (2-9 systems for level 4, 10 or more for level 5)     Review of Systems   Constitutional: Negative for chills, diaphoresis and fever. HENT: Negative for rhinorrhea and sore throat. Respiratory: Positive for shortness of breath. Negative for cough. Cardiovascular: Positive for chest pain. Negative for palpitations and leg swelling. Gastrointestinal: Negative for abdominal pain, diarrhea, nausea and vomiting. Genitourinary: Negative for dysuria and frequency. Neurological: Negative for dizziness, syncope and headaches. All other systems reviewed and are negative. PAST MEDICALHISTORY     Past Medical History:   Diagnosis Date    Depression     GERD (gastroesophageal reflux disease)          SURGICAL HISTORY     History reviewed.  No pertinent surgical history. CURRENT MEDICATIONS     Discharge Medication List as of 7/16/2020  1:36 AM      CONTINUE these medications which have NOT CHANGED    Details   famotidine (PEPCID) 20 MG tablet TAKE ONE TABLET TWO TIMES A DAY, Disp-60 tablet, R-5Normal      sertraline (ZOLOFT) 25 MG tablet TAKE ONE TABLET BY MOUTH ONE TIME A DAY, Disp-30 tablet, R-11Normal             ALLERGIES     Patient has no known allergies. FAMILY HISTORY       Family History   Problem Relation Age of Onset    Heart Attack Mother     High Blood Pressure Mother     Heart Disease Mother     High Blood Pressure Sister           SOCIAL HISTORY       Social History     Socioeconomic History    Marital status:      Spouse name: None    Number of children: None    Years of education: None    Highest education level: None   Occupational History    None   Social Needs    Financial resource strain: None    Food insecurity     Worry: None     Inability: None    Transportation needs     Medical: None     Non-medical: None   Tobacco Use    Smoking status: Never Smoker    Smokeless tobacco: Never Used   Substance and Sexual Activity    Alcohol use: Yes     Comment: occas.     Drug use: No    Sexual activity: None   Lifestyle    Physical activity     Days per week: None     Minutes per session: None    Stress: None   Relationships    Social connections     Talks on phone: None     Gets together: None     Attends Taoist service: None     Active member of club or organization: None     Attends meetings of clubs or organizations: None     Relationship status: None    Intimate partner violence     Fear of current or ex partner: None     Emotionally abused: None     Physically abused: None     Forced sexual activity: None   Other Topics Concern    None   Social History Narrative    None       SCREENINGS             PHYSICAL EXAM    (up to 7 for level 4, 8 or more for level 5)     ED Triage Vitals [07/15/20 1923]   BP Temp Temp src Pulse Resp SpO2 Height Weight   (!) 157/98 96.2 °F (35.7 °C) -- 67 18 96 % 5' 5\" (1.651 m) 190 lb (86.2 kg)       Physical Exam  Vitals signs and nursing note reviewed. Constitutional:       General: He is not in acute distress. Appearance: Normal appearance. He is well-developed. He is not ill-appearing, toxic-appearing or diaphoretic. HENT:      Head: Normocephalic and atraumatic. Nose: Nose normal.      Mouth/Throat:      Mouth: Mucous membranes are moist.   Eyes:      Conjunctiva/sclera: Conjunctivae normal.   Neck:      Musculoskeletal: Normal range of motion and neck supple. Trachea: No tracheal deviation. Cardiovascular:      Rate and Rhythm: Normal rate and regular rhythm. Pulses: Normal pulses. Heart sounds: Normal heart sounds. No murmur. Pulmonary:      Breath sounds: Normal breath sounds. No wheezing or rales. Abdominal:      Palpations: Abdomen is soft. There is no mass. Tenderness: There is no abdominal tenderness. Musculoskeletal: Normal range of motion. Right lower leg: No edema. Left lower leg: No edema. Skin:     General: Skin is warm and dry. Neurological:      Mental Status: He is alert and oriented to person, place, and time.          DIAGNOSTIC RESULTS     EKG: All EKG's areinterpreted by the Emergency Department Physician who either signs or Co-signs this chart in the absence of a cardiologist.    64 normal sinus rhythm no ST changes nondiagnostic EKG    RADIOLOGY:  Non-plain film images such as CT, Ultrasound and MRI are read by the radiologist. Plain radiographic images are visualized and preliminarily interpreted bythe emergency physician with the below findings:        XR CHEST PORTABLE    (Results Pending)           LABS:  Labs Reviewed   CBC WITH AUTO DIFFERENTIAL - Abnormal; Notable for the following components:       Result Value    RBC 4.64 (*)     All other components within normal limits   COMPREHENSIVE METABOLIC PANEL   TROPONIN TROPONIN       All other labs were within normal range or not returned as of this dictation. EMERGENCY DEPARTMENT COURSE and DIFFERENTIAL DIAGNOSIS/MDM:   Vitals:    Vitals:    07/15/20 2319 07/16/20 0038 07/16/20 0050 07/16/20 0140   BP: 108/77 113/77 103/76 108/78   Pulse: 50 62 52 52   Resp: 18 18 18 20   Temp:    98 °F (36.7 °C)   SpO2: 98% 96% 96% 98%   Weight:       Height:           MDM  Number of Diagnoses or Management Options  Acute chest pain:      Amount and/or Complexity of Data Reviewed  Clinical lab tests: ordered and reviewed  Tests in the radiology section of CPT®: ordered and reviewed  Independent visualization of images, tracings, or specimens: yes      VSS, pt well appearing, constant pain since yesterday, has active job and no obvious worsening symptoms while working it doesn't seem, trop neg x2, no real change in pain with nitro, low risk on HEART score, PERC neg, no concern for dissection,  discussed with patient needs stress test, offered him observation but agreeable with outpatient which I think is reasonable, does have some anxiety which could be factor tonight, understands follow up as well as return precautions        CONSULTS:  None    PROCEDURES:  Unless otherwise noted below, none     Procedures    FINAL IMPRESSION      1.  Acute chest pain          DISPOSITION/PLAN   DISPOSITION Decision To Discharge 07/16/2020 01:30:34 AM      PATIENT REFERRED TO:  Daisy Pierce, APRN  [address] [phone]    Call in 1 day      Ja Li MD  [facility], [address] [place] [address]  [phone]      As needed    [address] EMER[facility]  [facility]  [phone]    As needed, If symptoms worsen      DISCHARGE MEDICATIONS:  Discharge Medication List as of 7/16/2020  1:36 AM             (Please note that portions of this note were completed with a voice recognition program.  Efforts were made to edit thedictations

## 2020-07-16 NOTE — ED TRIAGE NOTES
Pt complains of chest heaviness and shortness of breath that started this morning and has gradually gotten worse

## 2020-07-16 NOTE — ED NOTES
ASSESSMENT:    PT ALERT/ORIENTED X4. PUPILS EQUAL/REACTIVE    SKIN:  WARM/DRY PINK CAPILLARY REFILL < 2SECS    CARDIAC:  S1 S2 NOTED c/o chest heaviness worse with breathing    LUNGS: CLEAR UPPER AND LOWER LOBES, RESPIRATIONS EVEN/UNLABORED     ABDOMEN: BOWEL SOUNDS NOTED UPPER AND LOWER QUADRANTS                     SOFT AND NONTENDER. EXTREMITIES:  BILATERAL DP AND PT AND NO EDEMA NOTED. NO DISTRESS NOTED. SIDE RAILS UP AND CALL LIGHT IN REACH.      La Nena Mcintosh RN  07/15/20 3745

## 2020-07-17 ENCOUNTER — TELEPHONE (OUTPATIENT)
Dept: PRIMARY CARE CLINIC | Age: 45
End: 2020-07-17

## 2020-07-17 ENCOUNTER — CARE COORDINATION (OUTPATIENT)
Dept: CARE COORDINATION | Age: 45
End: 2020-07-17

## 2020-07-17 NOTE — CARE COORDINATION
Patient contacted regarding Lion Page. Discussed COVID-19 related testing which was not done at this time. Test results were not done. Patient informed of results, if available? NA    Care Transition Nurse/ Ambulatory Care Manager contacted the wifeMatt by telephone to perform post discharge assessment. Verified name and  with wifeMatt as identifiers. Provided introduction to self, and explanation of the CTN/ACM role, and reason for call due to risk factors for infection and/or exposure to COVID-19. Symptoms reviewed with wifeMatt who verbalized the following symptoms: shortness of breath and chest pressure. Due to no new or worsening symptoms encounter was not routed to provider for escalation. Discussed follow-up appointments. If no appointment was previously scheduled, appointment scheduling offered: Yes  [address] follow up appointment(s):   Future Appointments   Date Time Provider Nolberto Noonan   2020  8:45 AM [id number]     [id number] follow up appointment(s): NA    ACM reviewed ER provider's recommendations with pt's wife, Matt Mccarthy. ACM requested pre-service contact [facility] regarding ER provider instructions to follow up with cardiology. Patricia Rock said she will send a request to nursing staff about scheduling an appointment for this patient with a provider there. Matt Mccarthy expressed understanding. Pt is taking his routine medications per Matt Mccarthy. ACM advised that if pt again becomes severely SOA, has CP - to return to ER for evaluation. Pt will have VV follow up with PCP as noted above. She will contact ACM if further assistance is needed. Advance Care Planning:   Does patient have an Advance Directive:  not on file. Patient has following risk factors of: no known risk factors.  CTN/ACM reviewed discharge instructions, medical action plan and red flags such as increased shortness of breath, increasing fever and signs of decompensation with wifeJosesito who verbalized understanding. Discussed exposure protocols and quarantine with CDC Guidelines What to do if you are sick with coronavirus disease 2019.  Josesito rincon was given an opportunity for questions and concerns. The wifeJosesito agrees to contact the [facility] exposure line [phone], [facility] department [address]: ([phone]) and PCP office for questions related to their healthcare. CTN/ACM provided contact information for future needs. Reviewed and educated Josesito rincon on any new and changed medications related to discharge diagnosis     Patient/family/caregiver given information for GetWell Loop and agrees to enroll yes  Patient's preferred e-mail: NA   Patient's preferred phone number: [phone]  Based on Loop alert triggers, patient will be contacted by nurse care manager for worsening symptoms. Pt will be further monitored by COVID Loop Team based on severity of symptoms and risk factors.

## 2020-07-17 NOTE — TELEPHONE ENCOUNTER
Patient is calling   to request referral to Cardiology as soon as possible. Patient was seen at [facility] for chest pain and sob 7/15/20. Please return call to update Patient on status. The best time to call is  Anytime    Thank you!

## 2020-07-17 NOTE — TELEPHONE ENCOUNTER
Called patient,informed will need ED follow up and to keep appointment on Monday. If any increase in symptoms to go to ED.  ALEXANDRA

## 2020-07-20 ENCOUNTER — VIRTUAL VISIT (OUTPATIENT)
Dept: PRIMARY CARE CLINIC | Age: 45
End: 2020-07-20
Payer: COMMERCIAL

## 2020-07-20 PROCEDURE — 99214 OFFICE O/P EST MOD 30 MIN: CPT | Performed by: NURSE PRACTITIONER

## 2020-07-20 RX ORDER — NYSTATIN 100000 U/G
CREAM TOPICAL
Qty: 1 TUBE | Refills: 1 | Status: SHIPPED | OUTPATIENT
Start: 2020-07-20

## 2020-07-20 RX ORDER — SERTRALINE HYDROCHLORIDE 25 MG/1
TABLET, FILM COATED ORAL
Qty: 30 TABLET | Refills: 11 | Status: SHIPPED | OUTPATIENT
Start: 2020-07-20 | End: 2021-06-07 | Stop reason: SDUPTHER

## 2020-07-20 RX ORDER — METRONIDAZOLE 500 MG/1
500 TABLET ORAL 2 TIMES DAILY
Qty: 14 TABLET | Refills: 0 | Status: SHIPPED | OUTPATIENT
Start: 2020-07-20 | End: 2020-07-27

## 2020-07-20 ASSESSMENT — ENCOUNTER SYMPTOMS
BACK PAIN: 0
ABDOMINAL PAIN: 0
VOMITING: 0
SINUS PAIN: 0
SINUS PRESSURE: 0
COUGH: 0
DIARRHEA: 0
NAUSEA: 0
EYE PAIN: 0
SHORTNESS OF BREATH: 1
WHEEZING: 0

## 2020-07-20 NOTE — PROGRESS NOTES
[address] [address]     Phone:  [phone]  Fax:  [phone]      2020    TELEHEALTH EVALUATION -- Audio/Visual (During SGOQG-11 public health emergency)    HPI:    Chief Complaint   Patient presents with    Chest Pain    Shortness of [address] (:  1975) has requested an audio/video evaluation for the following concern(s):ED follow up on chest pain, shortness of breath, fatigue. He went to ED on 7/15. It appears that cardiac work-up was negative. He states he has chest pain with exertion. He also has shortness of breath. He states that he was short of breath with chest pain while walking his dog yesterday. He states this stopped with rest.  He does have a family history of cardiac disease in his mother. He is currently asymptomatic and works construction. He also states he has been more fatigued lately. He does have a history of anxiety. It appears he was started on Zoloft, but he states he is not taking this as prescribed. He is not currently taking this medication at all. He admits to never taking this medication daily. Genital infection: He states he has irritation and white discharge around the head of his penis. He states he is not circumcised. He denies any fevers. He denies any new sexual partners. He has been treated with Flagyl in the past and this helped. He does work an active job where he sweats all the time. Review of Systems   Constitutional: Positive for fatigue. Negative for appetite change and fever. HENT: Negative for congestion, sinus pressure and sinus pain. Eyes: Negative for pain and visual disturbance. Respiratory: Positive for shortness of breath. Negative for cough and wheezing. Cardiovascular: Positive for chest pain. Negative for leg swelling. Gastrointestinal: Negative for abdominal pain, diarrhea, nausea and vomiting.    Endocrine: Negative for cold intolerance and heat intolerance. Genitourinary: Positive for discharge. Negative for dysuria, frequency and urgency. Musculoskeletal: Negative for arthralgias and back pain. Skin: Negative for rash and wound. Neurological: Negative for dizziness, weakness and headaches. Hematological: Negative for adenopathy. Psychiatric/Behavioral: Negative for dysphoric mood and sleep disturbance. The patient is nervous/anxious. Prior to Visit Medications    Medication Sig Taking? Authorizing Provider   sertraline (ZOLOFT) 25 MG tablet TAKE ONE TABLET BY MOUTH ONE TIME A DAY Yes PA Jeter   nystatin (MYCOSTATIN) 314974 UNIT/GM cream Apply topically 2 times daily. Yes PA Jeter   metroNIDAZOLE (FLAGYL) 500 MG tablet Take 1 tablet by mouth 2 times daily for 7 days Yes PA Whitaker   famotidine (PEPCID) 20 MG tablet TAKE ONE TABLET TWO TIMES A DAY  PA Thompson       Social History     Tobacco Use    Smoking status: Never Smoker    Smokeless tobacco: Never Used   Substance Use Topics    Alcohol use: Yes     Comment: occas.     Drug use: No        No Known Allergies,   Past Medical History:   Diagnosis Date    Depression     GERD (gastroesophageal reflux disease)    , No past surgical history on file.,   Family History   Problem Relation Age of Onset    Heart Attack Mother     High Blood Pressure Mother     Heart Disease Mother     High Blood Pressure Sister        PHYSICAL EXAMINATION:  [ INSTRUCTIONS:  \"[x]\" Indicates a positive item  \"[]\" Indicates a negative item  -- DELETE ALL ITEMS NOT EXAMINED]  Vital Signs: (As obtained by patient/caregiver at home)        Constitutional: [x] Appears well-developed and well-nourished [x] No apparent distress      [] Abnormal   Mental status  [x] Alert and awake  [x] Oriented to person/place/time [x]Able to follow commands        Eyes:  EOM    [x]  Normal  [] Abnormal-  Sclera  [x]  Normal  [] Abnormal -         Discharge []  None visible  [] Abnormal -    HENT:   [x] Normocephalic, atraumatic. [] Abnormal   [x] Mouth/Throat: Mucous membranes are moist.     External Ears [x] Normal  [] Abnormal-    Neck: [x] No visualized mass     Pulmonary/Chest: [x] Respiratory effort normal.  [x] No visualized signs of difficulty breathing or respiratory distress        [] Abnormal      Musculoskeletal:   [x] Normal gait with no signs of ataxia         [x] Normal range of motion of neck        [] Abnormal       Neurological:        [x] No Facial Asymmetry (Cranial nerve 7 motor function) (limited exam to video visit)          [] No gaze palsy        [] Abnormal         Skin:        [x] No significant exanthematous lesions or discoloration noted on facial skin         [] Abnormal            Psychiatric:       [x] Normal Affect [] Abnormal        [] No Hallucinations    Other pertinent observable physical exam findings:-    Due to this being a TeleHealth encounter, evaluation of the following organ systems is limited: Vitals/Constitutional/EENT/Resp/CV/GI//MS/Neuro/Skin/Heme-Lymph-Imm. ASSESSMENT/PLAN:  1. Other chest pain  I will order a stress test due to symptoms and family history. I also think the anxiety could be contributing to symptoms. Will refer to cardiology with undetermined or positive stress test.  - ECHO Stress Test; Future    2. Shortness of breath    - ECHO Stress Test; Future    3. Fatigue, unspecified type    - ECHO Stress Test; Future    4. Fam hx-ischem heart disease    - ECHO Stress Test; Future    5. Social anxiety disorder  Take every night  - sertraline (ZOLOFT) 25 MG tablet; TAKE ONE TABLET BY MOUTH ONE TIME A DAY  Dispense: 30 tablet; Refill: 11    6. Genital infection  Start medications. I advised him to clean and dry head of penis and under foreskin each morning then apply nystatin cream.  I have also advised to do this in the evening when he gets home from work as well.   - nystatin (MYCOSTATIN) [id number] UNIT/GM cream; Apply topically

## 2020-07-30 ENCOUNTER — HOSPITAL ENCOUNTER (OUTPATIENT)
Dept: NON INVASIVE DIAGNOSTICS | Age: 45
Discharge: HOME | End: 2020-07-30
Payer: COMMERCIAL

## 2020-07-30 LAB
LV EF: 50 %
LVEF MODALITY: NORMAL

## 2020-07-30 PROCEDURE — C8928 TTE W OR W/O FOL W/CON,STRES: HCPCS

## 2020-07-30 PROCEDURE — 6360000004 HC RX CONTRAST MEDICATION: Performed by: INTERNAL MEDICINE

## 2020-07-30 RX ADMIN — PERFLUTREN 2.2 MG: 6.52 INJECTION, SUSPENSION INTRAVENOUS at 09:25

## 2020-08-03 ENCOUNTER — VIRTUAL VISIT (OUTPATIENT)
Dept: PRIMARY CARE CLINIC | Age: 45
End: 2020-08-03
Payer: COMMERCIAL

## 2020-08-03 PROCEDURE — 99214 OFFICE O/P EST MOD 30 MIN: CPT | Performed by: NURSE PRACTITIONER

## 2020-08-03 ASSESSMENT — ENCOUNTER SYMPTOMS
PHOTOPHOBIA: 0
VOICE CHANGE: 0
VOMITING: 0
BACK PAIN: 0
COUGH: 0
NAUSEA: 0
SHORTNESS OF BREATH: 0
RHINORRHEA: 0
COLOR CHANGE: 0

## 2020-08-03 NOTE — PROGRESS NOTES
[facility] [address]             Phone:  [phone]  Fax:  [phone]       8/3/2020    TELEHEALTH EVALUATION -- Audio/Visual (During KDOCE-63 public health emergency)    HPI:  Chief Complaint   Patient presents with   [facility] (:  1975) has requested an audio/video evaluation for the following concern(s):    Patient presents today for follow-up chest pain/anxiety. He recently had a negative stress echo. He was started on sertraline and reports that over the last 2-3 weeks he has felt better. He denies any further chest pain or shortness of breath. Patient states he has decided he would like to move from where he is currently living and try for another job; he states his current job is too much stress and is worried about the long term effects this will have on him. Review of Systems   Constitutional: Negative for chills and fever. HENT: Negative for ear pain, hearing loss, rhinorrhea and voice change. Eyes: Negative for photophobia and visual disturbance. Respiratory: Negative for cough and shortness of breath. Cardiovascular: Negative for chest pain and palpitations. Gastrointestinal: Negative for nausea and vomiting. Endocrine: Negative. Negative for cold intolerance and heat intolerance. Genitourinary: Negative for difficulty urinating and flank pain. Musculoskeletal: Negative for back pain and neck pain. Skin: Negative for color change and rash. Allergic/Immunologic: Negative for environmental allergies and food allergies. Neurological: Negative for dizziness, speech difficulty and headaches. Hematological: Does not bruise/bleed easily. Psychiatric/Behavioral: Negative for sleep disturbance and suicidal ideas. Prior to Visit Medications    Medication Sig Taking?  Authorizing Provider   sertraline (ZOLOFT) 25 MG tablet TAKE ONE TABLET BY MOUTH ONE TIME A DAY  PA Sena (MYCOSTATIN) 756542 UNIT/GM cream Apply topically 2 times daily. PA Goddard   famotidine (PEPCID) 20 MG tablet TAKE ONE TABLET TWO TIMES A DAY  PA Leal       Social History     Tobacco Use    Smoking status: Never Smoker    Smokeless tobacco: Never Used   Substance Use Topics    Alcohol use: Yes     Comment: occas.  Drug use: No        No Known Allergies,   Past Medical History:   Diagnosis Date    Depression     GERD (gastroesophageal reflux disease)    , No past surgical history on file.,   Social History     Tobacco Use    Smoking status: Never Smoker    Smokeless tobacco: Never Used   Substance Use Topics    Alcohol use: Yes     Comment: occas.     Drug use: No   ,   Family History   Problem Relation Age of Onset    Heart Attack Mother     High Blood Pressure Mother     Heart Disease Mother     High Blood Pressure Sister    ,   Immunization History   Administered Date(s) Administered    Influenza, Quadv, IM, PF (6 mo and older Fluzone, Flulaval, Fluarix, and 3 yrs and older Afluria) 10/10/2019    Tdap (Boostrix, Adacel) 04/04/2016   ,   Health Maintenance   Topic Date Due    Lipid screen  03/02/2013    HIV screen  10/10/2020 (Originally 3/2/1988)    Flu vaccine (1) 09/01/2020    DTaP/Tdap/Td vaccine (2 - Td) 04/04/2026    Hepatitis A vaccine  Aged Out    Hepatitis B vaccine  Aged Out    Hib vaccine  Aged Out    Meningococcal (ACWY) vaccine  Aged Out    Pneumococcal 0-64 years Vaccine  Aged Out       PHYSICAL EXAMINATION:  [ INSTRUCTIONS:  \"[x]\" Indicates a positive item  \"[]\" Indicates a negative item  -- DELETE ALL ITEMS NOT EXAMINED]  [x] Alert  [x] Oriented to person/place/time    [x] No apparent distress  [] Toxic appearing    [] Face flushed appearing [x] Sclera clear  [] Lips are cyanotic      [x] Breathing appears normal  [] Appears tachypneic      [] Rash on visible skin    [x] Cranial Nerves II-XII grossly intact    [x] Motor grossly intact in visible upper extremities    [x] Motor grossly intact in visible lower extremities    [x] Normal Mood  [] Anxious appearing    [] Depressed appearing  [] Confused appearing      [] Poor short term memory  [] Poor long term memory    [] OTHER:      Due to this being a TeleHealth encounter, evaluation of the following organ systems is limited: Vitals/Constitutional/EENT/Resp/CV/GI//MS/Neuro/Skin/Heme-Lymph-Imm. ASSESSMENT/PLAN:  1. Anxiety and depression  Patient will remain on sertraline; I would like to see him back in 3 months. Approximately 30 minutes spent with patient in review of history, labs, imaging and discussion of treatment plan. Return in about 3 months (around 11/3/2020) for in office, anxiety/depression check, physical.      An  electronic signature was used to authenticate this note. --PA Barrios on 8/3/2020 at 9:58 AM        Pursuant to the emergency declaration under the [facility] waiver authority and the [employer] Act, this Virtual  Visit was conducted, with patient's consent, to reduce the patient's risk of exposure to COVID-19 and provide continuity of care for an established patient. Services were provided through a video synchronous discussion virtually to substitute for in-person clinic visit.

## 2020-08-12 RX ORDER — FAMOTIDINE 20 MG/1
TABLET, FILM COATED ORAL
Qty: 60 TABLET | Refills: 5 | Status: SHIPPED | OUTPATIENT
Start: 2020-08-12

## 2021-05-04 ENCOUNTER — APPOINTMENT (OUTPATIENT)
Dept: GENERAL RADIOLOGY | Age: 46
End: 2021-05-04
Payer: COMMERCIAL

## 2021-05-04 ENCOUNTER — HOSPITAL ENCOUNTER (EMERGENCY)
Age: 46
Discharge: HOME | End: 2021-05-04
Attending: EMERGENCY MEDICINE
Payer: COMMERCIAL

## 2021-05-04 VITALS
HEART RATE: 57 BPM | HEIGHT: 65 IN | TEMPERATURE: 99 F | DIASTOLIC BLOOD PRESSURE: 83 MMHG | BODY MASS INDEX: 30.82 KG/M2 | OXYGEN SATURATION: 97 % | RESPIRATION RATE: 17 BRPM | SYSTOLIC BLOOD PRESSURE: 124 MMHG | WEIGHT: 185 LBS

## 2021-05-04 DIAGNOSIS — R00.2 PALPITATIONS: Primary | ICD-10-CM

## 2021-05-04 DIAGNOSIS — F41.9 ANXIETY: ICD-10-CM

## 2021-05-04 LAB
ANION GAP SERPL CALCULATED.3IONS-SCNC: 9 MMOL/L (ref 7–19)
BUN BLDV-MCNC: 16 MG/DL (ref 6–20)
CALCIUM SERPL-MCNC: 9.4 MG/DL (ref 8.6–10)
CHLORIDE BLD-SCNC: 105 MMOL/L (ref 98–111)
CO2: 27 MMOL/L (ref 22–29)
CREAT SERPL-MCNC: 0.8 MG/DL (ref 0.5–1.2)
D DIMER: 0.33 UG/ML FEU (ref 0–0.48)
EKG P AXIS: 10 DEGREES
EKG P AXIS: 56 DEGREES
EKG P-R INTERVAL: 128 MS
EKG P-R INTERVAL: 152 MS
EKG Q-T INTERVAL: 356 MS
EKG Q-T INTERVAL: 376 MS
EKG QRS DURATION: 76 MS
EKG QRS DURATION: 78 MS
EKG QTC CALCULATION (BAZETT): 364 MS
EKG QTC CALCULATION (BAZETT): 376 MS
EKG T AXIS: 52 DEGREES
EKG T AXIS: 67 DEGREES
GFR AFRICAN AMERICAN: >59
GFR NON-AFRICAN AMERICAN: >60
GLUCOSE BLD-MCNC: 131 MG/DL (ref 74–109)
HCT VFR BLD CALC: 43.2 % (ref 42–52)
HEMOGLOBIN: 14.2 G/DL (ref 14–18)
MCH RBC QN AUTO: 30.3 PG (ref 27–31)
MCHC RBC AUTO-ENTMCNC: 32.9 G/DL (ref 33–37)
MCV RBC AUTO: 92.1 FL (ref 80–94)
PDW BLD-RTO: 12.5 % (ref 11.5–14.5)
PLATELET # BLD: 236 K/UL (ref 130–400)
PMV BLD AUTO: 11.9 FL (ref 9.4–12.4)
POTASSIUM REFLEX MAGNESIUM: 4 MMOL/L (ref 3.5–5)
RBC # BLD: 4.69 M/UL (ref 4.7–6.1)
SODIUM BLD-SCNC: 141 MMOL/L (ref 136–145)
T4 FREE: 1.24 NG/DL (ref 0.93–1.7)
TROPONIN: <0.01 NG/ML (ref 0–0.03)
TROPONIN: <0.01 NG/ML (ref 0–0.03)
TSH REFLEX FT4: 5.15 UIU/ML (ref 0.35–5.5)
WBC # BLD: 9 K/UL (ref 4.8–10.8)

## 2021-05-04 PROCEDURE — 93005 ELECTROCARDIOGRAM TRACING: CPT | Performed by: EMERGENCY MEDICINE

## 2021-05-04 PROCEDURE — 71045 X-RAY EXAM CHEST 1 VIEW: CPT

## 2021-05-04 PROCEDURE — 80048 BASIC METABOLIC PNL TOTAL CA: CPT

## 2021-05-04 PROCEDURE — 84439 ASSAY OF FREE THYROXINE: CPT

## 2021-05-04 PROCEDURE — 36415 COLL VENOUS BLD VENIPUNCTURE: CPT

## 2021-05-04 PROCEDURE — 99285 EMERGENCY DEPT VISIT HI MDM: CPT

## 2021-05-04 PROCEDURE — 84484 ASSAY OF TROPONIN QUANT: CPT

## 2021-05-04 PROCEDURE — 85027 COMPLETE CBC AUTOMATED: CPT

## 2021-05-04 PROCEDURE — 85379 FIBRIN DEGRADATION QUANT: CPT

## 2021-05-04 PROCEDURE — 93010 ELECTROCARDIOGRAM REPORT: CPT | Performed by: INTERNAL MEDICINE

## 2021-05-04 PROCEDURE — 84443 ASSAY THYROID STIM HORMONE: CPT

## 2021-05-04 ASSESSMENT — ENCOUNTER SYMPTOMS
ABDOMINAL PAIN: 0
EYE PAIN: 0
VOMITING: 0
SHORTNESS OF BREATH: 1
DIARRHEA: 0

## 2021-05-04 NOTE — ED PROVIDER NOTES
[facility] EMERGENCY DEPT  eMERGENCY dEPARTMENT eNCOUnter      Pt Name: Ashley Meneses  MRN: [id number]  [place] 1975  Date of evaluation: 5/4/2021  Provider: Hamlet Triplett MD    [address] [place] [address]       Chief Complaint   Patient presents with    Palpitations    Anxiety         HISTORY OF PRESENT ILLNESS   (Location/Symptom, Timing/Onset,Context/Setting, Quality, Duration, Modifying Factors, Severity)  Note limiting factors. Ashley Meneses is a 48 y.o. male who presents to the emergency department palpitations. Patient awoke and felt anxious about his heart was racing. Felt a little short of breath as well and little lightheaded. All the symptoms have since resolved. Has a history of anxiety but no other medical problems. Was feeling anxious earlier but no longer does. Never had chest pain. Felt short of breath earlier but this resolved. Palpitations also resolved. No unilateral leg swelling or pain. No history of DVT or PE. No hemoptysis. Again, totally asymptomatic at this time. HPI    NursingNotes were reviewed. REVIEW OF SYSTEMS    (2-9 systems for level 4, 10 or more for level 5)     Review of Systems   Constitutional: Negative for fever. Eyes: Negative for pain and visual disturbance. Respiratory: Positive for shortness of breath (gone). Cardiovascular: Positive for palpitations. Negative for chest pain. Gastrointestinal: Negative for abdominal pain, diarrhea and vomiting. Genitourinary: Negative for dysuria. Skin: Negative for rash. Neurological: Positive for light-headedness (resolved). Negative for syncope, weakness, numbness and headaches. Psychiatric/Behavioral: The patient is nervous/anxious. All other systems reviewed and are negative. A complete review of systems was performed and is negative except as noted above in the HPI.        PAST MEDICAL HISTORY     Past Medical History:   Diagnosis Date    Depression     GERD (gastroesophageal reflux disease) SURGICAL HISTORY     History reviewed. No pertinent surgical history. CURRENT MEDICATIONS       Previous Medications    FAMOTIDINE (PEPCID) 20 MG TABLET    TAKE 1 TABLET BY MOUTH 2 TIMES A DAY    NYSTATIN (MYCOSTATIN) [id number] UNIT/GM CREAM    Apply topically 2 times daily. SERTRALINE (ZOLOFT) 25 MG TABLET    TAKE ONE TABLET BY MOUTH ONE TIME A DAY       ALLERGIES     Patient has no known allergies. FAMILY HISTORY       Family History   Problem Relation Age of Onset    Heart Attack Mother     High Blood Pressure Mother     Heart Disease Mother     High Blood Pressure Sister           SOCIAL HISTORY       Social History     Socioeconomic History    Marital status:      Spouse name: None    Number of children: None    Years of education: None    Highest education level: None   Occupational History    None   Social Needs    Financial resource strain: None    Food insecurity     Worry: None     Inability: None    Transportation needs     Medical: None     Non-medical: None   Tobacco Use    Smoking status: Never Smoker    Smokeless tobacco: Never Used   Substance and Sexual Activity    Alcohol use: Yes     Comment: occas.     Drug use: No    Sexual activity: None   Lifestyle    Physical activity     Days per week: None     Minutes per session: None    Stress: None   Relationships    Social connections     Talks on phone: None     Gets together: None     Attends [employer] service: None     Active member of club or organization: None     Attends meetings of clubs or organizations: None     Relationship status: None    Intimate partner violence     Fear of current or ex partner: None     Emotionally abused: None     Physically abused: None     Forced sexual activity: None   Other Topics Concern    None   Social History Narrative    None       SCREENINGS    [place] Coma Scale  Eye Opening: Spontaneous  Best Verbal Response: Oriented  Best Motor Response: Obeys commands  [place] Coma Scale Score: 15        PHYSICAL EXAM    (up to 7 for level 4, 8 or more for level 5)     ED Triage Vitals [05/04/21 0045]   BP Temp Temp Source Pulse Resp SpO2 Height Weight   (!) 147/89 99 °F (37.2 °C) Oral 66 20 97 % 5' 5\" (1.651 m) 185 lb (83.9 kg)       Physical Exam  Vitals signs reviewed. Constitutional:       General: He is not in acute distress. Appearance: He is well-developed. HENT:      Head: Normocephalic and atraumatic. Mouth/Throat:      Mouth: Mucous membranes are moist.      Pharynx: Oropharynx is clear. Eyes:      General: No scleral icterus. Pupils: Pupils are equal, round, and reactive to light. Neck:      Musculoskeletal: Normal range of motion and neck supple. Vascular: No JVD. Cardiovascular:      Rate and Rhythm: Normal rate and regular rhythm. Pulses: Normal pulses. Heart sounds: Normal heart sounds. No murmur. Pulmonary:      Effort: Pulmonary effort is normal. No respiratory distress. Breath sounds: Normal breath sounds. Abdominal:      General: There is no distension. Palpations: Abdomen is soft. Tenderness: There is no abdominal tenderness. There is no guarding or rebound. Musculoskeletal:         General: No tenderness. Right lower leg: No edema. Left lower leg: No edema. Skin:     General: Skin is warm and dry. Capillary Refill: Capillary refill takes less than 2 seconds. Neurological:      General: No focal deficit present. Mental Status: He is alert and oriented to person, place, and time. Psychiatric:         Mood and Affect: Mood normal.         Behavior: Behavior normal.         DIAGNOSTIC RESULTS     EKG: All EKG's are interpreted by the Emergency Department Physician who either signs or Co-signs this chart in the absence of a cardiologist.    Normal sinus rhythm. Normal QT. No signs of acute ischemia. Repeat EKG shows normal sinus rhythm. Normal QT.   Borderline ST changes. RADIOLOGY:   Non-plain film images such as CT, Ultrasound and MRI are read by the radiologist. Ashley Helix images are visualized and preliminarily interpreted by the emergency physician with the below findings:      Chest: No acute disease    Interpretation per the Radiologist below, if available at the time of this note:    XR CHEST PORTABLE    (Results Pending)         LABS:  Labs Reviewed   CBC - Abnormal; Notable for the following components:       Result Value    RBC 4.69 (*)     MCHC 32.9 (*)     All other components within normal limits   BASIC METABOLIC PANEL W/ REFLEX TO MG FOR LOW K - Abnormal; Notable for the following components:    Glucose 131 (*)     All other components within normal limits   TROPONIN   TSH WITH REFLEX TO FT4   D-DIMER, QUANTITATIVE   T4, FREE   TROPONIN       All other labs were within normal range or not returned as of this dictation. EMERGENCY DEPARTMENT COURSE and DIFFERENTIALDIAGNOSIS/MDM:   Vitals:    Vitals:    05/04/21 0045 05/04/21 0101 05/04/21 0130 05/04/21 0200   BP: (!) 147/89 127/79 118/78 119/80   Pulse: 66 58 60 62   Resp: 20 19 14 17   Temp: 99 °F (37.2 °C)      TempSrc: Oral      SpO2: 97%      Weight: 185 lb (83.9 kg)      Height: 5' 5\" (1.651 m)          MDM  Low risk heart score    Symptoms resolved. Patient resting comfortably and asymptomatic at this time. Suspect symptoms probably due to anxiety but cannot be certain about this. Stable for discharge and see no indication for admission. Told patient to follow-up with his doctor for further evaluation and to return to the ER for change or worsening symptoms or new concerns. Patient agreeable plan    CONSULTS:  None    PROCEDURES:  Unless otherwise notedbelow, none     Procedures    FINAL IMPRESSION     1. Palpitations    2.  Anxiety          DISPOSITION/PLAN   DISPOSITION Decision To Discharge 05/04/2021 03:37:30 AM      PATIENT REFERRED TO:  @FUP@    DISCHARGE MEDICATIONS:  New Prescriptions    No medications on file          (Please note that portions of this note were completed with a voice recognition program.  Efforts were made to edit the dictations butoccasionally words are mis-transcribed.)    Hailey Adler MD (electronically signed)  AttendingEmergency Physician         Hailey Adler MD  05/04/21 3292

## 2021-05-04 NOTE — ED NOTES
PT states he woke up from a bad dream and began to have palpitations and feels very uneasy. PT states he cannot get the feeling to resolve.       Isaac Woo RN  05/04/21 [id number]

## 2021-05-12 ENCOUNTER — OFFICE VISIT (OUTPATIENT)
Dept: PRIMARY CARE CLINIC | Age: 46
End: 2021-05-12
Payer: COMMERCIAL

## 2021-05-12 VITALS
DIASTOLIC BLOOD PRESSURE: 84 MMHG | OXYGEN SATURATION: 99 % | HEART RATE: 70 BPM | HEIGHT: 65 IN | SYSTOLIC BLOOD PRESSURE: 136 MMHG | TEMPERATURE: 97.7 F | WEIGHT: 187 LBS | RESPIRATION RATE: 16 BRPM | BODY MASS INDEX: 31.16 KG/M2

## 2021-05-12 DIAGNOSIS — F40.10 SOCIAL ANXIETY DISORDER: Primary | ICD-10-CM

## 2021-05-12 DIAGNOSIS — Z13.31 POSITIVE DEPRESSION SCREENING: ICD-10-CM

## 2021-05-12 DIAGNOSIS — F41.9 ANXIETY AND DEPRESSION: ICD-10-CM

## 2021-05-12 DIAGNOSIS — F32.A ANXIETY AND DEPRESSION: ICD-10-CM

## 2021-05-12 PROCEDURE — 80305 DRUG TEST PRSMV DIR OPT OBS: CPT | Performed by: NURSE PRACTITIONER

## 2021-05-12 PROCEDURE — G8431 POS CLIN DEPRES SCRN F/U DOC: HCPCS | Performed by: NURSE PRACTITIONER

## 2021-05-12 PROCEDURE — 99214 OFFICE O/P EST MOD 30 MIN: CPT | Performed by: NURSE PRACTITIONER

## 2021-05-12 RX ORDER — CLONAZEPAM 0.5 MG/1
0.5 TABLET ORAL 2 TIMES DAILY
Qty: 15 TABLET | Refills: 0 | Status: SHIPPED | OUTPATIENT
Start: 2021-05-12 | End: 2021-05-27

## 2021-05-12 ASSESSMENT — PATIENT HEALTH QUESTIONNAIRE - PHQ9
SUM OF ALL RESPONSES TO PHQ QUESTIONS 1-9: 15
4. FEELING TIRED OR HAVING LITTLE ENERGY: 2
SUM OF ALL RESPONSES TO PHQ QUESTIONS 1-9: 15
8. MOVING OR SPEAKING SO SLOWLY THAT OTHER PEOPLE COULD HAVE NOTICED. OR THE OPPOSITE, BEING SO FIGETY OR RESTLESS THAT YOU HAVE BEEN MOVING AROUND A LOT MORE THAN USUAL: 1
5. POOR APPETITE OR OVEREATING: 2
10. IF YOU CHECKED OFF ANY PROBLEMS, HOW DIFFICULT HAVE THESE PROBLEMS MADE IT FOR YOU TO DO YOUR WORK, TAKE CARE OF THINGS AT HOME, OR GET ALONG WITH OTHER PEOPLE: 0
3. TROUBLE FALLING OR STAYING ASLEEP: 2
SUM OF ALL RESPONSES TO PHQ9 QUESTIONS 1 & 2: 6
7. TROUBLE CONCENTRATING ON THINGS, SUCH AS READING THE NEWSPAPER OR WATCHING TELEVISION: 1

## 2021-05-12 ASSESSMENT — ENCOUNTER SYMPTOMS
RHINORRHEA: 0
PHOTOPHOBIA: 0
BACK PAIN: 0
NAUSEA: 0
SHORTNESS OF BREATH: 0
COUGH: 0
COLOR CHANGE: 0
VOMITING: 0
VOICE CHANGE: 0

## 2021-05-12 ASSESSMENT — COLUMBIA-SUICIDE SEVERITY RATING SCALE - C-SSRS: 2. HAVE YOU ACTUALLY HAD ANY THOUGHTS OF KILLING YOURSELF?: NO

## 2021-05-12 NOTE — PROGRESS NOTES
[address] [facility]  [address] [address] [address]  Dept: [phone]  Dept Fax: [phone]  Loc: [phone]    Philip Marshall is a 48 y.o. male who presents today for his medical conditions/complaints as noted below. Philip Marshall is c/o of Anxiety (about 4-5 days ago he had a nightmare and woke up feeling really scared; he went to the ED and everything was normal; he states all he wants to do is sleep and has not been feeling like himself; he states he has difficulty going into places because it causes anxiety; he stopped his zoloft for a little while, but took it again this morning to be able to come into the office today)        HPI:     HPI   Chief Complaint   Patient presents with    Anxiety     about 4-5 days ago he had a nightmare and woke up feeling really scared; he went to the ED and everything was normal; he states all he wants to do is sleep and has not been feeling like himself; he states he has difficulty going into places because it causes anxiety; he stopped his zoloft for a little while, but took it again this morning to be able to come into the office today     Patient presents today for evaluation of anxiety. He states 5 nights ago he had an anxiety attack; he states it woke him in the middle of the night as if he had had a nightmare. He went to the ED; negative work-up--suspected all symptoms were anxiety related which patient attests to. He states he is feeling anxious and his \"mind is not clear\". He is wanting to sleep a lot, decreased appetite. He states he stopped his sertraline but since this happened a few days ago he started taking it again. He states he is having a lot of stress with his work; he feels like he is being asked to do more than he can handle. Recently his wife had to move out due to a job; she is in [employer]. He is hoping to switch jobs in a couple weeks. He is feeling overwhelmed. He denies SI.        Past Medical History:   Diagnosis Date    Depression     GERD (gastroesophageal reflux disease)       No past surgical history on file. Vitals 5/12/2021 5/4/2021 5/4/2021 5/4/2021 5/4/2021 8/0/7999   SYSTOLIC 436 825 [phone] 218   DIASTOLIC 84 83 82 81 80 78   Pulse 70 57 57 56 62 60   Temp 97.7 - - - - -   Resp 16 17 13 13 17 14   SpO2 99 - - - - -   Weight 187 lb - - - - -   Height 5' 5\" - - - - -   Body mass index 31.12 kg/m2 - - - - -   Pain Level - - - - - -   Some recent data might be hidden       Family History   Problem Relation Age of Onset    Heart Attack Mother     High Blood Pressure Mother     Heart Disease Mother     High Blood Pressure Sister        Social History     Tobacco Use    Smoking status: Never Smoker    Smokeless tobacco: Never Used   Substance Use Topics    Alcohol use: Yes     Comment: occas. Current Outpatient Medications on File Prior to Visit   Medication Sig Dispense Refill    sertraline (ZOLOFT) 25 MG tablet TAKE ONE TABLET BY MOUTH ONE TIME A DAY 30 tablet 11    famotidine (PEPCID) 20 MG tablet TAKE 1 TABLET BY MOUTH 2 TIMES A DAY (Patient not taking: Reported on 5/12/2021) 60 tablet 5    nystatin (MYCOSTATIN) 810343 UNIT/GM cream Apply topically 2 times daily. (Patient not taking: Reported on 5/12/2021) 1 Tube 1     No current facility-administered medications on file prior to visit. No Known Allergies    Health Maintenance   Topic Date Due    Hepatitis C screen  Never done    COVID-19 Vaccine (1) Never done    HIV screen  Never done    Lipid screen  Never done    Flu vaccine (Season Ended) 09/01/2021    Diabetes screen  05/03/2022    DTaP/Tdap/Td vaccine (2 - Td) 04/04/2026    Hepatitis A vaccine  Aged Out    Hepatitis B vaccine  Aged Out    Hib vaccine  Aged Out    Meningococcal (ACWY) vaccine  Aged Out    Pneumococcal 0-64 years Vaccine  Aged Out       Subjective:      Review of Systems   Constitutional: Negative for chills and fever.    HENT: Negative for ear pain, hearing loss, rhinorrhea and voice change. Eyes: Negative for photophobia and visual disturbance. Respiratory: Negative for cough and shortness of breath. Cardiovascular: Negative for chest pain and palpitations. Gastrointestinal: Negative for nausea and vomiting. Endocrine: Negative. Negative for cold intolerance and heat intolerance. Genitourinary: Negative for difficulty urinating and flank pain. Musculoskeletal: Negative for back pain and neck pain. Skin: Negative for color change and rash. Allergic/Immunologic: Negative for environmental allergies and food allergies. Neurological: Negative for dizziness, speech difficulty and headaches. Hematological: Does not bruise/bleed easily. Psychiatric/Behavioral: Negative for sleep disturbance and suicidal ideas. The patient is nervous/anxious. Objective:     Physical Exam  Vitals signs and nursing note reviewed. Constitutional:       Appearance: He is well-developed. HENT:      Head: Atraumatic. Right Ear: External ear normal.      Left Ear: External ear normal.      Nose: Nose normal.   Eyes:      Conjunctiva/sclera: Conjunctivae normal.      Pupils: Pupils are equal, round, and reactive to light. Neck:      Musculoskeletal: Normal range of motion and neck supple. Cardiovascular:      Rate and Rhythm: Normal rate and regular rhythm. Heart sounds: Normal heart sounds, S1 normal and S2 normal.   Pulmonary:      Effort: Pulmonary effort is normal.      Breath sounds: Normal breath sounds. Abdominal:      General: Bowel sounds are normal.      Palpations: Abdomen is soft. Musculoskeletal: Normal range of motion. Skin:     General: Skin is warm and dry. Neurological:      Mental Status: He is alert and oriented to person, place, and time.    Psychiatric:         Behavior: Behavior normal.       /84   Pulse 70   Temp 97.7 °F (36.5 °C) (Temporal)   Resp 16   Ht 5' 5\" (1.651 m)   Wt 187 lb (84.8 kg)   SpO2 99%   BMI 31.12 kg/m²     Assessment:       Diagnosis Orders   1. Social anxiety disorder     2. Positive depression screening  clonazePAM (KLONOPIN) 0.5 MG tablet    POCT Rapid Drug Screen    Positive Screen for Clinical Depression with a Documented Follow-up Plan    3. Anxiety and depression           Plan:   More than 50% of the time was spent counseling and coordinating care for a total time of 30 min face to face. Will give patient #15 0.5 mg klonopin to use only as needed. He is to remain on his sertraline and take this everyday. I will see him in 3 weeks to reassess. A lot of his anxiety seems situational with a lot of changes going on his life right now. Patient will consider counseling if this is not improving. He denies Steffany Jonescristhianmarcella was reviewed today per office protocol. Report shows No discrepancies. Fill pattern is consistent from single provider(s) at single pharmacy(s). Patient given educational materials -see patient instructions. Discussed use, benefit, and side effects of prescribed medications. All patient questions answered. Pt voiced understanding. Reviewed health maintenance. Instructed to continue currentmedications, diet and exercise. Patient agreed with treatment plan. Follow up as directed. MEDICATIONS:  Orders Placed This Encounter   Medications    clonazePAM (KLONOPIN) 0.5 MG tablet     Sig: Take 1 tablet by mouth 2 times daily for 15 days. Dispense:  15 tablet     Refill:  0         ORDERS:  Orders Placed This Encounter   Procedures    POCT Rapid Drug Screen    Positive Screen for Clinical Depression with a Documented Follow-up Plan        Follow-up:  Return in about 3 weeks (around 6/2/2021) for in office, anxiety/depression check. PATIENT INSTRUCTIONS:  Patient Instructions   We are committed to providing you with the best care possible.    In order to help us achieve these goals please remember to bring all medications, herbal products, and over the counter supplements with you to each visit. If your provider has ordered testing for you, please be sure to follow up with our office if you have not received results within 7 days after the testing took place. *If you receive a survey after visiting one of our offices, please take time to share your experience concerning your physician office visit. These surveys are confidential and no health information about you is shared. We are eager to improve for you and we are counting on your feedback to help make that happen. Electronically signed by PA Solis on 5/12/2021 at 9:31 AM    EMR Dragon/transcription disclaimer:  Much of thisencounter note is electronic transcription/translation of spoken language to printed texts. The electronic translation of spoken language may be erroneous, or at times, nonsensical words or phrases may be inadvertentlytranscribed.   Although I have reviewed the note for such errors, some may still exist.

## 2021-06-07 ENCOUNTER — OFFICE VISIT (OUTPATIENT)
Dept: PRIMARY CARE CLINIC | Age: 46
End: 2021-06-07
Payer: COMMERCIAL

## 2021-06-07 VITALS
RESPIRATION RATE: 14 BRPM | WEIGHT: 184.8 LBS | DIASTOLIC BLOOD PRESSURE: 74 MMHG | OXYGEN SATURATION: 98 % | HEIGHT: 65 IN | HEART RATE: 63 BPM | SYSTOLIC BLOOD PRESSURE: 132 MMHG | TEMPERATURE: 97.7 F | BODY MASS INDEX: 30.79 KG/M2

## 2021-06-07 DIAGNOSIS — F32.A ANXIETY AND DEPRESSION: Primary | ICD-10-CM

## 2021-06-07 DIAGNOSIS — F41.9 ANXIETY AND DEPRESSION: Primary | ICD-10-CM

## 2021-06-07 DIAGNOSIS — F40.10 SOCIAL ANXIETY DISORDER: ICD-10-CM

## 2021-06-07 DIAGNOSIS — B37.49 CANDIDIASIS, UROGENITAL: ICD-10-CM

## 2021-06-07 PROCEDURE — 99214 OFFICE O/P EST MOD 30 MIN: CPT | Performed by: NURSE PRACTITIONER

## 2021-06-07 RX ORDER — FLUCONAZOLE 150 MG/1
150 TABLET ORAL ONCE
Qty: 1 TABLET | Refills: 0 | Status: SHIPPED | OUTPATIENT
Start: 2021-06-07 | End: 2021-06-07

## 2021-06-07 NOTE — PROGRESS NOTES
[address] [facility]  [address] [facility] [address] [address]  Dept: [phone]  Dept Fax: [phone]  Loc: [phone]    Francia Vidales is a 48 y.o. male who presents today for his medical conditions/complaints as noted below. Francia Vidales is c/o of Anxiety (took the new medication a couple of times, but did not like the way it made him feel; he states it made him very drowsy; he has started a new job and that has helped some), Headache (he feels like he get a stress headache in the back of his head), and Yeast Infection (he was given a cream previously, but does not think he used it right and thinks he needs more)    HPI:     HPI   Chief Complaint   Patient presents with    Anxiety     took the new medication a couple of times, but did not like the way it made him feel; he states it made him very drowsy; he has started a new job and that has helped some    Headache     he feels like he get a stress headache in the back of his head    Yeast Infection     he was given a cream previously, but does not think he used it right and thinks he needs more     Patient presents today for follow-up anxiety. He has been taking sertraline 50 mg daily and states it has helped him overall mildly feel better. He has taken klonopin twice and states he did not like how it made him feel. His job is stressful and works long hours but his new job has helped he states. He denies SI. He complains of re-occurring rash/redness to penis when he pulls foreskin back. He has had this previously and states diflucan improved symptoms. Past Medical History:   Diagnosis Date    Depression     GERD (gastroesophageal reflux disease)       No past surgical history on file.     Vitals 6/7/2021 5/12/2021 5/4/2021 5/4/2021 5/4/2021 8/9/7153   SYSTOLIC 657 196 391 [phone]   DIASTOLIC 74 84 83 82 81 80   Pulse 63 70 57 57 56 62   Temp 97.7 97.7 - - - -   Resp 14 16 17 13 13 17   SpO2 98 99 - - - -   Weight 184 lb 12.8 oz 187 lb - - - -   Height 5' 5\" 5' 5\" - - - -   Body mass index 30.75 kg/m2 31.12 kg/m2 - - - -   Pain Level - - - - - -   Some recent data might be hidden       Family History   Problem Relation Age of Onset    Heart Attack Mother     High Blood Pressure Mother     Heart Disease Mother     High Blood Pressure Sister        Social History     Tobacco Use    Smoking status: Never Smoker    Smokeless tobacco: Never Used   Substance Use Topics    Alcohol use: Yes     Comment: occas. Current Outpatient Medications on File Prior to Visit   Medication Sig Dispense Refill    clonazePAM (KLONOPIN) 0.5 MG tablet Take 1 tablet by mouth 2 times daily for 15 days. 15 tablet 0    famotidine (PEPCID) 20 MG tablet TAKE 1 TABLET BY MOUTH 2 TIMES A DAY (Patient not taking: Reported on 5/12/2021) 60 tablet 5    nystatin (MYCOSTATIN) 873235 UNIT/GM cream Apply topically 2 times daily. (Patient not taking: Reported on 6/7/2021) 1 Tube 1     No current facility-administered medications on file prior to visit. No Known Allergies    Health Maintenance   Topic Date Due    Hepatitis C screen  Never done    COVID-19 Vaccine (1) Never done    HIV screen  Never done    Lipid screen  Never done    Flu vaccine (Season Ended) 09/01/2021    Diabetes screen  05/03/2022    DTaP/Tdap/Td vaccine (2 - Td or Tdap) 04/04/2026    Hepatitis A vaccine  Aged Out    Hepatitis B vaccine  Aged Out    Hib vaccine  Aged Out    Meningococcal (ACWY) vaccine  Aged Out    Pneumococcal 0-64 years Vaccine  Aged Out       Subjective:      Review of Systems   Constitutional: Negative for chills and fever. HENT: Negative for ear pain, hearing loss, rhinorrhea and voice change. Eyes: Negative for photophobia and visual disturbance. Respiratory: Negative for cough and shortness of breath. Cardiovascular: Negative for chest pain and palpitations. Gastrointestinal: Negative for nausea and vomiting. Endocrine: Negative. Negative for cold intolerance and heat intolerance. Genitourinary: Negative for difficulty urinating and flank pain. Musculoskeletal: Negative for back pain and neck pain. Skin: Negative for color change and rash. Allergic/Immunologic: Negative for environmental allergies and food allergies. Neurological: Negative for dizziness, speech difficulty and headaches. Hematological: Does not bruise/bleed easily. Psychiatric/Behavioral: Negative for sleep disturbance and suicidal ideas. Objective:     Physical Exam  Vitals and nursing note reviewed. Constitutional:       Appearance: He is well-developed. HENT:      Head: Atraumatic. Right Ear: External ear normal.      Left Ear: External ear normal.      Nose: Nose normal.   Eyes:      Conjunctiva/sclera: Conjunctivae normal.      Pupils: Pupils are equal, round, and reactive to light. Cardiovascular:      Rate and Rhythm: Normal rate and regular rhythm. Heart sounds: Normal heart sounds, S1 normal and S2 normal.   Pulmonary:      Effort: Pulmonary effort is normal.      Breath sounds: Normal breath sounds. Abdominal:      General: Bowel sounds are normal.      Palpations: Abdomen is soft. Musculoskeletal:         General: Normal range of motion. Cervical back: Normal range of motion and neck supple. Skin:     General: Skin is warm and dry. Neurological:      Mental Status: He is alert and oriented to person, place, and time. Psychiatric:         Behavior: Behavior normal.       /74   Pulse 63   Temp 97.7 °F (36.5 °C) (Temporal)   Resp 14   Ht 5' 5\" (1.651 m)   Wt 184 lb 12.8 oz (83.8 kg)   SpO2 98%   BMI 30.75 kg/m²     Assessment:       Diagnosis Orders   1. Anxiety and depression     2. Social anxiety disorder Stable sertraline (ZOLOFT) 50 MG tablet    She was doing well on Zoloft 25 mg daily he is now able to go and stores and feels more comfortable and relaxed work.     3. Candidiasis, urogenital           Plan:   More than 50% of the time was spent counseling and coordinating care for a total time of 30 min face to face. Diflucan x 1 dose. Nystatin if needed. Increase sertraline to 100 mg daily. Follow-up in 1 month for medication check. Patient given educational materials -see patient instructions. Discussed use, benefit, and side effects of prescribed medications. All patient questions answered. Pt voiced understanding. Reviewed health maintenance. Instructed to continue currentmedications, diet and exercise. Patient agreed with treatment plan. Follow up as directed. MEDICATIONS:  Orders Placed This Encounter   Medications    fluconazole (DIFLUCAN) 150 MG tablet     Sig: Take 1 tablet by mouth once for 1 dose     Dispense:  1 tablet     Refill:  0    sertraline (ZOLOFT) 50 MG tablet     Sig: TAKE ONE TABLET BY MOUTH ONE TIME A DAY     Dispense:  30 tablet     Refill:  3         ORDERS:  No orders of the defined types were placed in this encounter. Follow-up:  Return in about 3 months (around 9/7/2021) for in office. PATIENT INSTRUCTIONS:  Patient Instructions   We are committed to providing you with the best care possible. In order to help us achieve these goals please remember to bring all medications, herbal products, and over the counter supplements with you to each visit. If your provider has ordered testing for you, please be sure to follow up with our office if you have not received results within 7 days after the testing took place. *If you receive a survey after visiting one of our offices, please take time to share your experience concerning your physician office visit. These surveys are confidential and no health information about you is shared. We are eager to improve for you and we are counting on your feedback to help make that happen.       Electronically signed by PA Solis on 6/8/2021 at 4:48 PM    EMR Dragon/transcription

## 2021-06-08 ASSESSMENT — ENCOUNTER SYMPTOMS
COUGH: 0
VOICE CHANGE: 0
SHORTNESS OF BREATH: 0
BACK PAIN: 0
NAUSEA: 0
RHINORRHEA: 0
PHOTOPHOBIA: 0
COLOR CHANGE: 0
VOMITING: 0

## 2022-02-17 DIAGNOSIS — F40.10 SOCIAL ANXIETY DISORDER: ICD-10-CM
